# Patient Record
Sex: MALE | Race: WHITE | NOT HISPANIC OR LATINO
[De-identification: names, ages, dates, MRNs, and addresses within clinical notes are randomized per-mention and may not be internally consistent; named-entity substitution may affect disease eponyms.]

---

## 2017-02-03 ENCOUNTER — APPOINTMENT (OUTPATIENT)
Dept: INTERNAL MEDICINE | Facility: CLINIC | Age: 42
End: 2017-02-03

## 2017-02-03 VITALS
SYSTOLIC BLOOD PRESSURE: 120 MMHG | DIASTOLIC BLOOD PRESSURE: 76 MMHG | HEIGHT: 73 IN | HEART RATE: 100 BPM | OXYGEN SATURATION: 99 % | BODY MASS INDEX: 33.8 KG/M2 | TEMPERATURE: 97.6 F | WEIGHT: 255 LBS

## 2017-02-03 DIAGNOSIS — M54.9 DORSALGIA, UNSPECIFIED: ICD-10-CM

## 2017-02-06 LAB
ALBUMIN SERPL ELPH-MCNC: 4.8 G/DL
ALP BLD-CCNC: 66 U/L
ALT SERPL-CCNC: 19 U/L
ANION GAP SERPL CALC-SCNC: 20 MMOL/L
APPEARANCE: CLEAR
AST SERPL-CCNC: 21 U/L
BASOPHILS # BLD AUTO: 0.02 K/UL
BASOPHILS NFR BLD AUTO: 0.3 %
BILIRUB SERPL-MCNC: 1 MG/DL
BILIRUBIN URINE: NEGATIVE
BLOOD URINE: NEGATIVE
BUN SERPL-MCNC: 25 MG/DL
C TRACH DNA SPEC QL NAA+PROBE: NORMAL
C TRACH RRNA SPEC QL NAA+PROBE: NORMAL
CALCIUM SERPL-MCNC: 10.1 MG/DL
CHLORIDE SERPL-SCNC: 100 MMOL/L
CHOLEST SERPL-MCNC: 172 MG/DL
CHOLEST/HDLC SERPL: 4.8 RATIO
CO2 SERPL-SCNC: 20 MMOL/L
COLOR: YELLOW
CREAT SERPL-MCNC: 1.27 MG/DL
EOSINOPHIL # BLD AUTO: 0.12 K/UL
EOSINOPHIL NFR BLD AUTO: 1.7 %
GLUCOSE QUALITATIVE U: NORMAL MG/DL
GLUCOSE SERPL-MCNC: 98 MG/DL
HBA1C MFR BLD HPLC: 5.3 %
HCT VFR BLD CALC: 49.9 %
HDLC SERPL-MCNC: 36 MG/DL
HGB BLD-MCNC: 16.6 G/DL
HIV1+2 AB SPEC QL IA.RAPID: NONREACTIVE
HSV 1+2 IGG SER IA-IMP: POSITIVE
HSV 1+2 IGG SER IA-IMP: POSITIVE
HSV1 IGG SER QL: 61.8 INDEX
HSV2 IGG SER QL: 2.22 INDEX
IMM GRANULOCYTES NFR BLD AUTO: 0.1 %
KETONES URINE: NEGATIVE
LDLC SERPL CALC-MCNC: 107 MG/DL
LEUKOCYTE ESTERASE URINE: NEGATIVE
LYMPHOCYTES # BLD AUTO: 2.09 K/UL
LYMPHOCYTES NFR BLD AUTO: 29.9 %
MAN DIFF?: NORMAL
MCHC RBC-ENTMCNC: 30.6 PG
MCHC RBC-ENTMCNC: 33.3 GM/DL
MCV RBC AUTO: 92.1 FL
MONOCYTES # BLD AUTO: 0.45 K/UL
MONOCYTES NFR BLD AUTO: 6.4 %
N GONORRHOEA DNA SPEC QL NAA+PROBE: NORMAL
N GONORRHOEA RRNA SPEC QL NAA+PROBE: NORMAL
NEUTROPHILS # BLD AUTO: 4.3 K/UL
NEUTROPHILS NFR BLD AUTO: 61.6 %
NITRITE URINE: NEGATIVE
PH URINE: 6
PLATELET # BLD AUTO: 178 K/UL
POTASSIUM SERPL-SCNC: 4.7 MMOL/L
PROT SERPL-MCNC: 7 G/DL
PROTEIN URINE: NEGATIVE MG/DL
PSA SERPL-MCNC: 0.67 NG/ML
RBC # BLD: 5.42 M/UL
RBC # FLD: 14.7 %
SODIUM SERPL-SCNC: 140 MMOL/L
SOURCE AMPLIFICATION: NORMAL
SPECIFIC GRAVITY URINE: 1.02
T PALLIDUM AB SER QL IA: NEGATIVE
TRIGL SERPL-MCNC: 147 MG/DL
UROBILINOGEN URINE: NORMAL MG/DL
WBC # FLD AUTO: 6.99 K/UL

## 2017-05-25 ENCOUNTER — APPOINTMENT (OUTPATIENT)
Dept: INTERNAL MEDICINE | Facility: CLINIC | Age: 42
End: 2017-05-25

## 2017-05-25 VITALS
DIASTOLIC BLOOD PRESSURE: 78 MMHG | OXYGEN SATURATION: 98 % | TEMPERATURE: 98.6 F | HEIGHT: 72 IN | BODY MASS INDEX: 34.54 KG/M2 | SYSTOLIC BLOOD PRESSURE: 130 MMHG | HEART RATE: 76 BPM | WEIGHT: 255 LBS

## 2017-05-26 LAB
HAV IGG+IGM SER QL: REACTIVE
HBV SURFACE AB SER QL: REACTIVE
HBV SURFACE AG SER QL: NONREACTIVE
HCV AB SER QL: NONREACTIVE
HCV S/CO RATIO: 0.1 S/CO

## 2018-05-24 ENCOUNTER — APPOINTMENT (OUTPATIENT)
Dept: INTERNAL MEDICINE | Facility: CLINIC | Age: 43
End: 2018-05-24
Payer: COMMERCIAL

## 2018-05-24 VITALS — TEMPERATURE: 97.8 F

## 2018-05-24 PROCEDURE — 99213 OFFICE O/P EST LOW 20 MIN: CPT

## 2018-05-24 RX ORDER — DEXTROAMPHETAMINE SACCHARATE, AMPHETAMINE ASPARTATE, DEXTROAMPHETAMINE SULFATE AND AMPHETAMINE SULFATE 5; 5; 5; 5 MG/1; MG/1; MG/1; MG/1
20 TABLET ORAL
Qty: 60 | Refills: 0 | Status: ACTIVE | COMMUNITY
Start: 2017-12-07

## 2018-05-24 NOTE — ASSESSMENT
[FreeTextEntry1] : CDC recommendations for each of these countries was reviewed with patient. Note that he is already immune to hepatitis A and hepatitis B and received typhoid vaccine 18 months ago.\par \par DTaP and malaria prophylaxis advised for all countries. In addition polio booster and yellow fever advised for [place].\par \par Patient states he will decide by next week and will make arrangements for appropriate vaccination at that time.

## 2018-06-06 ENCOUNTER — APPOINTMENT (OUTPATIENT)
Dept: INTERNAL MEDICINE | Facility: CLINIC | Age: 43
End: 2018-06-06

## 2018-06-06 ENCOUNTER — APPOINTMENT (OUTPATIENT)
Dept: INTERNAL MEDICINE | Facility: CLINIC | Age: 43
End: 2018-06-06
Payer: COMMERCIAL

## 2018-06-06 DIAGNOSIS — Z23 ENCOUNTER FOR IMMUNIZATION: ICD-10-CM

## 2018-06-06 DIAGNOSIS — Z71.89 OTHER SPECIFIED COUNSELING: ICD-10-CM

## 2018-06-06 PROCEDURE — 90471 IMMUNIZATION ADMIN: CPT

## 2018-06-06 PROCEDURE — 99214 OFFICE O/P EST MOD 30 MIN: CPT | Mod: 25

## 2018-06-06 PROCEDURE — 90715 TDAP VACCINE 7 YRS/> IM: CPT

## 2018-06-06 RX ORDER — PREDNISONE 10 MG/1
10 TABLET ORAL
Qty: 21 | Refills: 0 | Status: DISCONTINUED | COMMUNITY
Start: 2018-01-02 | End: 2018-06-06

## 2018-06-06 RX ORDER — ARIPIPRAZOLE 15 MG/1
15 TABLET ORAL
Qty: 30 | Refills: 0 | Status: DISCONTINUED | COMMUNITY
Start: 2017-12-07 | End: 2018-06-06

## 2018-06-06 RX ORDER — VILAZODONE HYDROCHLORIDE 40 MG/1
40 TABLET ORAL
Qty: 30 | Refills: 0 | Status: DISCONTINUED | COMMUNITY
Start: 2017-10-19 | End: 2018-06-06

## 2018-06-06 RX ORDER — AMOXICILLIN AND CLAVULANATE POTASSIUM 875; 125 MG/1; MG/1
875-125 TABLET, COATED ORAL
Qty: 20 | Refills: 0 | Status: DISCONTINUED | COMMUNITY
Start: 2017-12-25 | End: 2018-06-06

## 2018-06-06 RX ORDER — CIPROFLOXACIN HYDROCHLORIDE 500 MG/1
500 TABLET, FILM COATED ORAL TWICE DAILY
Qty: 28 | Refills: 1 | Status: DISCONTINUED | COMMUNITY
Start: 2017-05-25 | End: 2018-06-06

## 2018-06-06 RX ORDER — ARIPIPRAZOLE 5 MG/1
5 TABLET ORAL
Qty: 30 | Refills: 0 | Status: DISCONTINUED | COMMUNITY
Start: 2017-11-25 | End: 2018-06-06

## 2018-06-06 RX ORDER — SERTRALINE HYDROCHLORIDE 50 MG/1
50 TABLET, FILM COATED ORAL
Qty: 30 | Refills: 0 | Status: DISCONTINUED | COMMUNITY
Start: 2017-05-23 | End: 2018-06-06

## 2018-06-06 RX ORDER — LORAZEPAM 0.5 MG/1
0.5 TABLET ORAL
Qty: 30 | Refills: 0 | Status: DISCONTINUED | COMMUNITY
Start: 2017-12-21 | End: 2018-06-06

## 2018-07-10 ENCOUNTER — LABORATORY RESULT (OUTPATIENT)
Age: 43
End: 2018-07-10

## 2018-07-10 ENCOUNTER — APPOINTMENT (OUTPATIENT)
Dept: INTERNAL MEDICINE | Facility: CLINIC | Age: 43
End: 2018-07-10
Payer: COMMERCIAL

## 2018-07-10 VITALS
OXYGEN SATURATION: 96 % | DIASTOLIC BLOOD PRESSURE: 88 MMHG | HEIGHT: 72 IN | BODY MASS INDEX: 34.81 KG/M2 | HEART RATE: 71 BPM | TEMPERATURE: 98.1 F | SYSTOLIC BLOOD PRESSURE: 112 MMHG | WEIGHT: 257 LBS

## 2018-07-10 PROCEDURE — G0444 DEPRESSION SCREEN ANNUAL: CPT

## 2018-07-10 PROCEDURE — G0447 BEHAVIOR COUNSEL OBESITY 15M: CPT

## 2018-07-10 PROCEDURE — 99396 PREV VISIT EST AGE 40-64: CPT | Mod: 25

## 2018-07-10 PROCEDURE — 99213 OFFICE O/P EST LOW 20 MIN: CPT | Mod: 25

## 2018-07-10 PROCEDURE — 36415 COLL VENOUS BLD VENIPUNCTURE: CPT

## 2018-07-10 NOTE — HEALTH RISK ASSESSMENT
[Good] : ~his/her~  mood as  good [No falls in past year] : Patient reported no falls in the past year [Patient reported colonoscopy was abnormal] : Patient reported colonoscopy was abnormal [Hepatitis C test offered] : Hepatitis C test offered [Alone] : lives alone [Unemployed] : unemployed [Single] : single [Sexually Active] : sexually active [Fully functional (bathing, dressing, toileting, transferring, walking, feeding)] : Fully functional (bathing, dressing, toileting, transferring, walking, feeding) [Fully functional (using the telephone, shopping, preparing meals, housekeeping, doing laundry, using] : Fully functional and needs no help or supervision to perform IADLs (using the telephone, shopping, preparing meals, housekeeping, doing laundry, using transportation, managing medications and managing finances) [Smoke Detector] : smoke detector [Seat Belt] :  uses seat belt [Sunscreen] : uses sunscreen [Patient declined discussion] : Patient declined discussion [] : No [Change in mental status noted] : No change in mental status noted [High Risk Behavior] : no high risk behavior [Reports changes in hearing] : Reports no changes in hearing [Reports changes in vision] : Reports no changes in vision [TB Exposure] : is not being exposed to tuberculosis [ColonoscopyDate] : 6/2014 [ColonoscopyComments] : polyps

## 2018-07-10 NOTE — HISTORY OF PRESENT ILLNESS
[FreeTextEntry1] : He is now 42 years old.\par In the past year, no hospitalizations, no surgery, no new medical diagnoses. [de-identified] : Patient is concerned about progressive enlargement of epigastric hernia over the past 5 or 6 months. States it has been there as a small bulge for years but has become a large vertical bulge in the past 6 months. May be associated with a small umbilicus herniation as well. Both of these are completely painless and has not affected any type of gastrointestinal function. The umbilical area is a little tender to palpation.

## 2018-07-10 NOTE — COUNSELING
[Weight management counseling provided] : Weight management [Healthy eating counseling provided] : healthy eating [Activity counseling provided] : activity [Good understanding] : Patient has a good understanding of lifestyle changes and the steps needed to achieve self management goals [ - Annual Depression Screening] : Annual Depression Screening

## 2018-07-10 NOTE — ASSESSMENT
[FreeTextEntry1] : Health maintenance.\par His BMI is significantly elevated and at least a 35 pound weight loss is recommended. Obesity-associated morbidities reviewed and dietary and lifestyle approaches to achieve a weight loss were discussed in detail. Patient declines referral to obesity management at this time.\par Daily aerobic exercises strongly recommended.\par Minimal STD risk and no substance abuse per patient report. Safe sex behavior was discussed and STD testing is sent.\par HIV and hepatitis C antibodies sent with patient approval.\par Occasional gender specific self-examination is suggested.\par Minimal depression without suicidality. Competent with ADLs. At patient request, Wellbutrin has been represcribed.\par Yearly flu vaccine is recommended.\par \par Epigastric hernia.\par We had an extended conversation for at least 10 minutes regarding the prognosis and treatment options for this condition. Surgical correction is usually not necessary as strangulation or other complication is extremely unlikely. However progressive worsening is certainly possible especially given this patient's lifestyle which includes weightlifting and other fairly intensive sports activities.\par Based on the above, I have recommended that he consult with a hernia surgeon regarding indications for surgery and appropriate further workup including possible MRI studies. Specific contact information provided. Patient states she will make an appointment within the next few weeks.\par \par Depression.\par Patient requests refill of bupropion as he has not had the chance to see a psychiatrist in the past year or so. This was provided for 6 months. Psychotherapic counseling Was undertaken for about 10 minutes But patient also advised to establish a relationship with a new psychotherapist or psychiatrist. Patient understands that I will be willing to prescribe standard antidepressant medication as long as he is also in psychotherapy.\par \par

## 2018-07-10 NOTE — PHYSICAL EXAM

## 2018-07-11 LAB
ALBUMIN SERPL ELPH-MCNC: 4.5 G/DL
ALP BLD-CCNC: 69 U/L
ALT SERPL-CCNC: 15 U/L
ANION GAP SERPL CALC-SCNC: 19 MMOL/L
APPEARANCE: ABNORMAL
AST SERPL-CCNC: 24 U/L
BASOPHILS # BLD AUTO: 0.01 K/UL
BASOPHILS NFR BLD AUTO: 0.2 %
BILIRUB SERPL-MCNC: 0.6 MG/DL
BILIRUBIN URINE: NEGATIVE
BLOOD URINE: NEGATIVE
BUN SERPL-MCNC: 32 MG/DL
CALCIUM SERPL-MCNC: 9.3 MG/DL
CHLORIDE SERPL-SCNC: 101 MMOL/L
CHOLEST SERPL-MCNC: 150 MG/DL
CHOLEST/HDLC SERPL: 4.8 RATIO
CO2 SERPL-SCNC: 19 MMOL/L
COLOR: YELLOW
CREAT SERPL-MCNC: 1.21 MG/DL
EOSINOPHIL # BLD AUTO: 0.18 K/UL
EOSINOPHIL NFR BLD AUTO: 3.1 %
GLUCOSE QUALITATIVE U: NEGATIVE MG/DL
GLUCOSE SERPL-MCNC: 70 MG/DL
HBA1C MFR BLD HPLC: 5 %
HCT VFR BLD CALC: 46.7 %
HCV AB SER QL: NONREACTIVE
HCV S/CO RATIO: 0.08 S/CO
HDLC SERPL-MCNC: 31 MG/DL
HGB BLD-MCNC: 15 G/DL
IMM GRANULOCYTES NFR BLD AUTO: 0.3 %
KETONES URINE: NEGATIVE
LDLC SERPL CALC-MCNC: 78 MG/DL
LEUKOCYTE ESTERASE URINE: NEGATIVE
LYMPHOCYTES # BLD AUTO: 2.43 K/UL
LYMPHOCYTES NFR BLD AUTO: 41.7 %
MAN DIFF?: NORMAL
MCHC RBC-ENTMCNC: 30.1 PG
MCHC RBC-ENTMCNC: 32.1 GM/DL
MCV RBC AUTO: 93.8 FL
MONOCYTES # BLD AUTO: 0.51 K/UL
MONOCYTES NFR BLD AUTO: 8.7 %
NEUTROPHILS # BLD AUTO: 2.68 K/UL
NEUTROPHILS NFR BLD AUTO: 46 %
NITRITE URINE: NEGATIVE
PH URINE: 5
PLATELET # BLD AUTO: 179 K/UL
POTASSIUM SERPL-SCNC: 4.4 MMOL/L
PROT SERPL-MCNC: 6.9 G/DL
PROTEIN URINE: NEGATIVE MG/DL
PSA SERPL-MCNC: 0.28 NG/ML
RBC # BLD: 4.98 M/UL
RBC # FLD: 14.3 %
SODIUM SERPL-SCNC: 139 MMOL/L
SPECIFIC GRAVITY URINE: 1.03
TRIGL SERPL-MCNC: 204 MG/DL
UROBILINOGEN URINE: NEGATIVE MG/DL
WBC # FLD AUTO: 5.83 K/UL

## 2018-07-16 LAB
TESTOST BND SERPL-MCNC: 2.4 PG/ML
TESTOST SERPL-MCNC: 217.3 NG/DL

## 2018-08-13 ENCOUNTER — APPOINTMENT (OUTPATIENT)
Dept: SURGERY | Facility: CLINIC | Age: 43
End: 2018-08-13
Payer: COMMERCIAL

## 2018-08-13 VITALS
DIASTOLIC BLOOD PRESSURE: 82 MMHG | HEART RATE: 97 BPM | HEIGHT: 73.23 IN | TEMPERATURE: 98.3 F | WEIGHT: 259 LBS | OXYGEN SATURATION: 96 % | BODY MASS INDEX: 33.96 KG/M2 | SYSTOLIC BLOOD PRESSURE: 135 MMHG

## 2018-08-13 PROCEDURE — 99204 OFFICE O/P NEW MOD 45 MIN: CPT

## 2018-10-01 ENCOUNTER — RX RENEWAL (OUTPATIENT)
Age: 43
End: 2018-10-01

## 2018-10-01 RX ORDER — BUPROPION HYDROCHLORIDE 300 MG/1
300 TABLET, EXTENDED RELEASE ORAL
Qty: 90 | Refills: 3 | Status: ACTIVE | COMMUNITY
Start: 2017-12-07 | End: 1900-01-01

## 2018-10-03 ENCOUNTER — APPOINTMENT (OUTPATIENT)
Dept: INTERNAL MEDICINE | Facility: CLINIC | Age: 43
End: 2018-10-03
Payer: COMMERCIAL

## 2018-10-03 VITALS
TEMPERATURE: 98.3 F | OXYGEN SATURATION: 98 % | HEIGHT: 72 IN | HEART RATE: 84 BPM | BODY MASS INDEX: 35.21 KG/M2 | SYSTOLIC BLOOD PRESSURE: 130 MMHG | WEIGHT: 260 LBS | DIASTOLIC BLOOD PRESSURE: 88 MMHG

## 2018-10-03 DIAGNOSIS — Z86.010 PERSONAL HISTORY OF COLONIC POLYPS: ICD-10-CM

## 2018-10-03 PROCEDURE — 36415 COLL VENOUS BLD VENIPUNCTURE: CPT

## 2018-10-03 PROCEDURE — 99214 OFFICE O/P EST MOD 30 MIN: CPT | Mod: 25

## 2018-10-04 LAB
CHOLEST SERPL-MCNC: 156 MG/DL
CHOLEST/HDLC SERPL: 3.2 RATIO
HDLC SERPL-MCNC: 49 MG/DL
LDLC SERPL CALC-MCNC: 85 MG/DL
SHBG SERPL-SCNC: 28 NMOL/L
TRIGL SERPL-MCNC: 109 MG/DL

## 2018-10-08 LAB
TESTOST BND SERPL-MCNC: 7.5 PG/ML
TESTOST SERPL-MCNC: 345.2 NG/DL

## 2019-03-18 ENCOUNTER — APPOINTMENT (OUTPATIENT)
Dept: INTERNAL MEDICINE | Facility: CLINIC | Age: 44
End: 2019-03-18
Payer: COMMERCIAL

## 2019-03-18 VITALS
SYSTOLIC BLOOD PRESSURE: 136 MMHG | HEIGHT: 73 IN | WEIGHT: 264 LBS | HEART RATE: 98 BPM | TEMPERATURE: 98.6 F | OXYGEN SATURATION: 95 % | BODY MASS INDEX: 34.99 KG/M2 | DIASTOLIC BLOOD PRESSURE: 88 MMHG

## 2019-03-18 PROCEDURE — 36415 COLL VENOUS BLD VENIPUNCTURE: CPT

## 2019-03-18 PROCEDURE — 99213 OFFICE O/P EST LOW 20 MIN: CPT | Mod: 25

## 2019-03-21 LAB
TESTOST BND SERPL-MCNC: 5.6 PG/ML
TESTOST SERPL-MCNC: 369.3 NG/DL

## 2019-03-22 ENCOUNTER — APPOINTMENT (OUTPATIENT)
Dept: UROLOGY | Facility: CLINIC | Age: 44
End: 2019-03-22
Payer: COMMERCIAL

## 2019-03-22 DIAGNOSIS — Z31.41 ENCOUNTER FOR FERTILITY TESTING: ICD-10-CM

## 2019-03-22 DIAGNOSIS — R68.82 DECREASED LIBIDO: ICD-10-CM

## 2019-03-22 PROCEDURE — 99203 OFFICE O/P NEW LOW 30 MIN: CPT

## 2019-03-22 RX ORDER — SILDENAFIL 100 MG/1
100 TABLET, FILM COATED ORAL
Qty: 15 | Refills: 0 | Status: ACTIVE | COMMUNITY
Start: 2019-03-22 | End: 1900-01-01

## 2019-03-26 ENCOUNTER — LABORATORY RESULT (OUTPATIENT)
Age: 44
End: 2019-03-26

## 2019-04-29 ENCOUNTER — APPOINTMENT (OUTPATIENT)
Dept: UROLOGY | Facility: CLINIC | Age: 44
End: 2019-04-29
Payer: COMMERCIAL

## 2019-04-29 PROCEDURE — 99213 OFFICE O/P EST LOW 20 MIN: CPT

## 2019-04-29 NOTE — ASSESSMENT
[FreeTextEntry1] : Reviewed testosterone and gonadotropins\par semen analysis \par volume 1.0 cc\par [95] \par 53% normal  motility\par 1 % normal morphology\par discussed significance \par not attempting to conceive\par semen analysis poor predictive value\par \par discussed low semen volume and low libido\par discussed Clomid 25 mg days and rationale for approach\par repeat labs \par side effects of increased acne breast tenderness hair loss visual disturbance\par patient is anxious to proceed\par fu labs in 6 weeks and followup \par

## 2019-04-29 NOTE — HISTORY OF PRESENT ILLNESS
[FreeTextEntry1] : 43 year old \par single \par straight\par no partner currently \par complaining of erectile dysfunction\par without vigra not able to function\par difficulty obtaining and maintain erection\par long history of testerone abuse x 10 years 1000 mg weekly\par stopped in 2014\par then started on T by endocrinologist   2 year ago and then  maintained by psychiatrist ( DR Tan)\par last T replacement 9/2018\par \par

## 2019-05-06 ENCOUNTER — APPOINTMENT (OUTPATIENT)
Dept: INTERNAL MEDICINE | Facility: CLINIC | Age: 44
End: 2019-05-06
Payer: COMMERCIAL

## 2019-05-06 VITALS — TEMPERATURE: 98.3 F

## 2019-05-06 DIAGNOSIS — Z02.0 ENCOUNTER FOR EXAMINATION FOR ADMISSION TO EDUCATIONAL INSTITUTION: ICD-10-CM

## 2019-05-06 PROCEDURE — 36415 COLL VENOUS BLD VENIPUNCTURE: CPT

## 2019-05-08 LAB
MEV IGG FLD QL IA: >300 AU/ML
MEV IGG+IGM SER-IMP: POSITIVE
MUV AB SER-ACNC: POSITIVE
MUV IGG SER QL IA: 230 AU/ML
RUBV IGG FLD-ACNC: 22.6 INDEX
RUBV IGG SER-IMP: POSITIVE
VZV AB TITR SER: POSITIVE
VZV IGG SER IF-ACNC: 1147 INDEX

## 2019-05-22 ENCOUNTER — MEDICATION RENEWAL (OUTPATIENT)
Age: 44
End: 2019-05-22

## 2019-06-06 LAB
BASOPHILS # BLD AUTO: 0.02 K/UL
BASOPHILS NFR BLD AUTO: 0.3 %
EOSINOPHIL # BLD AUTO: 0.08 K/UL
EOSINOPHIL NFR BLD AUTO: 1.1 %
ESTRADIOL SERPL-MCNC: 35 PG/ML
FSH SERPL-MCNC: 8.1 IU/L
HCT VFR BLD CALC: 45.7 %
HGB BLD-MCNC: 15.6 G/DL
IMM GRANULOCYTES NFR BLD AUTO: 0.1 %
LH SERPL-ACNC: 15.5 IU/L
LYMPHOCYTES # BLD AUTO: 2.47 K/UL
LYMPHOCYTES NFR BLD AUTO: 34.7 %
MAN DIFF?: NORMAL
MCHC RBC-ENTMCNC: 30.6 PG
MCHC RBC-ENTMCNC: 34.1 GM/DL
MCV RBC AUTO: 89.6 FL
MONOCYTES # BLD AUTO: 0.39 K/UL
MONOCYTES NFR BLD AUTO: 5.5 %
NEUTROPHILS # BLD AUTO: 4.15 K/UL
NEUTROPHILS NFR BLD AUTO: 58.3 %
PLATELET # BLD AUTO: 181 K/UL
RBC # BLD: 5.1 M/UL
RBC # FLD: 13.2 %
TESTOST SERPL-MCNC: 440 NG/DL
WBC # FLD AUTO: 7.12 K/UL

## 2019-06-17 ENCOUNTER — APPOINTMENT (OUTPATIENT)
Dept: UROLOGY | Facility: CLINIC | Age: 44
End: 2019-06-17
Payer: COMMERCIAL

## 2019-06-17 DIAGNOSIS — N52.2 DRUG-INDUCED ERECTILE DYSFUNCTION: ICD-10-CM

## 2019-06-17 DIAGNOSIS — E29.1 TESTICULAR HYPOFUNCTION: ICD-10-CM

## 2019-06-17 PROCEDURE — 99213 OFFICE O/P EST LOW 20 MIN: CPT

## 2019-06-17 NOTE — HISTORY OF PRESENT ILLNESS
[FreeTextEntry1] : 43 year old \par single \par straight\par no partner currently \par complaining of erectile dysfunction\par without vigra not able to function\par difficulty obtaining and maintain erection\par long history of testerone abuse x 10 years 1000 mg weekly\par stopped in 2014\par then started on T by endocrinologist   2 year ago and then  maintained by psychiatrist ( DR Tan)\par last T replacement 9/2018\par \par \par 6.17.2019\par started on clomid\par continues to complain of loss of libido \par "no desire" to have sexual intercourse\par no side effects\par HAY 14; no steady partner

## 2019-06-17 NOTE — ASSESSMENT
[FreeTextEntry1] : Patient has had appropriate hormonal response to clomid\par Has not achieved improved libido\par Will maintain current regimen and reassess in 3 months\par \par Depression well controlled.\par His psychiatrist has retired\par Referred to Dr Toro for management

## 2019-06-17 NOTE — PHYSICAL EXAM
[Urethral Meatus] : meatus normal [Penis Abnormality] : normal circumcised penis [Epididymis] : the epididymides were normal [Testes Tenderness] : no tenderness of the testes [Skin Color & Pigmentation] : normal skin color and pigmentation [FreeTextEntry1] : no gynecomastia

## 2019-08-06 ENCOUNTER — LABORATORY RESULT (OUTPATIENT)
Age: 44
End: 2019-08-06

## 2019-08-09 ENCOUNTER — APPOINTMENT (OUTPATIENT)
Dept: UROLOGY | Facility: CLINIC | Age: 44
End: 2019-08-09
Payer: COMMERCIAL

## 2019-08-09 LAB — ESTRADIOL SERPL-MCNC: 43 PG/ML

## 2019-08-09 PROCEDURE — 99213 OFFICE O/P EST LOW 20 MIN: CPT

## 2019-08-09 RX ORDER — ARIPIPRAZOLE 10 MG/1
10 TABLET ORAL
Qty: 30 | Refills: 0 | Status: DISCONTINUED | COMMUNITY
Start: 2017-04-28 | End: 2019-08-09

## 2019-08-09 RX ORDER — FLUTICASONE PROPIONATE 50 UG/1
50 SPRAY, METERED NASAL
Qty: 16 | Refills: 0 | Status: DISCONTINUED | COMMUNITY
Start: 2017-12-25 | End: 2019-08-09

## 2019-08-09 RX ORDER — NEOMYCIN SULFATE, POLYMYXIN B SULFATE, HYDROCORTISONE 3.5; 10000; 1 MG/ML; [USP'U]/ML; MG/ML
1 SOLUTION/ DROPS AURICULAR (OTIC)
Qty: 10 | Refills: 0 | Status: DISCONTINUED | COMMUNITY
Start: 2018-01-02 | End: 2019-08-09

## 2019-08-09 RX ORDER — ATOVAQUONE AND PROGUANIL HYDROCHLORIDE 250; 100 MG/1; MG/1
250-100 TABLET, FILM COATED ORAL DAILY
Qty: 30 | Refills: 0 | Status: DISCONTINUED | COMMUNITY
Start: 2017-05-25 | End: 2019-08-09

## 2019-08-09 NOTE — ASSESSMENT
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

## 2019-08-09 NOTE — HISTORY OF PRESENT ILLNESS
[FreeTextEntry1] : 43 year old \par single \par straight\par no partner currently \par complaining of erectile dysfunction\par without vigra not able to function\par difficulty obtaining and maintain erection\par long history of testerone abuse x 10 years 1000 mg weekly\par stopped in 2014\par then started on T by endocrinologist 2 year ago and then maintained by psychiatrist ( DR Tan)\par last T replacement 9/2018\par \par \par 6.17.2019\par started on clomid\par continues to complain of loss of libido \par "no desire" to have sexual intercourse\par no side effects\par HAY 14; no steady partner \par \par 8.9.2019\par returned from trip [place]\par continues to complain of sexual dysfunction\par started on Sertraline for depression which has responded\par no longer depressed

## 2019-08-12 ENCOUNTER — RX RENEWAL (OUTPATIENT)
Age: 44
End: 2019-08-12

## 2019-08-13 LAB
TESTOST BND SERPL-MCNC: 10.2 PG/ML
TESTOST SERPL-MCNC: 534.1 NG/DL

## 2019-08-14 LAB — SHBG-ESOTERIX: 42.2 NMOL/L

## 2021-01-27 ENCOUNTER — APPOINTMENT (OUTPATIENT)
Dept: INTERNAL MEDICINE | Facility: CLINIC | Age: 46
End: 2021-01-27
Payer: MEDICAID

## 2021-01-27 VITALS
TEMPERATURE: 97.6 F | HEIGHT: 73 IN | DIASTOLIC BLOOD PRESSURE: 82 MMHG | WEIGHT: 263 LBS | SYSTOLIC BLOOD PRESSURE: 135 MMHG | BODY MASS INDEX: 34.85 KG/M2 | HEART RATE: 83 BPM | OXYGEN SATURATION: 95 %

## 2021-01-27 DIAGNOSIS — Z12.11 ENCOUNTER FOR SCREENING FOR MALIGNANT NEOPLASM OF COLON: ICD-10-CM

## 2021-01-27 DIAGNOSIS — R39.11 HESITANCY OF MICTURITION: ICD-10-CM

## 2021-01-27 DIAGNOSIS — R00.2 PALPITATIONS: ICD-10-CM

## 2021-01-27 DIAGNOSIS — F32.9 MAJOR DEPRESSIVE DISORDER, SINGLE EPISODE, UNSPECIFIED: ICD-10-CM

## 2021-01-27 PROCEDURE — 99396 PREV VISIT EST AGE 40-64: CPT | Mod: 25

## 2021-01-27 PROCEDURE — 93000 ELECTROCARDIOGRAM COMPLETE: CPT

## 2021-01-27 PROCEDURE — G0447 BEHAVIOR COUNSEL OBESITY 15M: CPT

## 2021-01-27 PROCEDURE — 99212 OFFICE O/P EST SF 10 MIN: CPT | Mod: 25

## 2021-01-27 PROCEDURE — 99072 ADDL SUPL MATRL&STAF TM PHE: CPT

## 2021-01-27 RX ORDER — CLOMIPHENE CITRATE 50 MG/1
50 TABLET ORAL DAILY
Qty: 15 | Refills: 2 | Status: DISCONTINUED | COMMUNITY
Start: 2019-06-17 | End: 2021-01-27

## 2021-01-27 RX ORDER — RANITIDINE HYDROCHLORIDE 300 MG/1
300 CAPSULE ORAL
Qty: 60 | Refills: 3 | Status: DISCONTINUED | COMMUNITY
Start: 2017-05-25 | End: 2021-01-27

## 2021-01-27 RX ORDER — ARIPIPRAZOLE 15 MG/1
15 TABLET ORAL DAILY
Refills: 0 | Status: ACTIVE | COMMUNITY
Start: 2021-01-27

## 2021-01-27 RX ORDER — CLOMIPHENE CITRATE 50 MG/1
50 TABLET ORAL
Qty: 50 | Refills: 2 | Status: DISCONTINUED | COMMUNITY
Start: 2019-04-29 | End: 2021-01-27

## 2021-01-28 LAB
ALBUMIN SERPL ELPH-MCNC: 5 G/DL
ALP BLD-CCNC: 95 U/L
ALT SERPL-CCNC: 14 U/L
ANION GAP SERPL CALC-SCNC: 12 MMOL/L
APPEARANCE: CLEAR
AST SERPL-CCNC: 20 U/L
BASOPHILS # BLD AUTO: 0.04 K/UL
BASOPHILS NFR BLD AUTO: 0.5 %
BILIRUB SERPL-MCNC: 0.7 MG/DL
BILIRUBIN URINE: NEGATIVE
BLOOD URINE: NEGATIVE
BUN SERPL-MCNC: 23 MG/DL
CALCIUM SERPL-MCNC: 9.8 MG/DL
CHLORIDE SERPL-SCNC: 103 MMOL/L
CHOLEST SERPL-MCNC: 178 MG/DL
CO2 SERPL-SCNC: 25 MMOL/L
COLOR: YELLOW
CREAT SERPL-MCNC: 1.17 MG/DL
EOSINOPHIL # BLD AUTO: 0.12 K/UL
EOSINOPHIL NFR BLD AUTO: 1.6 %
ESTIMATED AVERAGE GLUCOSE: 100 MG/DL
GLUCOSE QUALITATIVE U: NEGATIVE
GLUCOSE SERPL-MCNC: 117 MG/DL
HBA1C MFR BLD HPLC: 5.1 %
HCT VFR BLD CALC: 46.9 %
HDLC SERPL-MCNC: 52 MG/DL
HGB BLD-MCNC: 15.2 G/DL
IMM GRANULOCYTES NFR BLD AUTO: 0.1 %
KETONES URINE: NEGATIVE
LDLC SERPL CALC-MCNC: 92 MG/DL
LEUKOCYTE ESTERASE URINE: NEGATIVE
LYMPHOCYTES # BLD AUTO: 2.48 K/UL
LYMPHOCYTES NFR BLD AUTO: 34 %
MAN DIFF?: NORMAL
MCHC RBC-ENTMCNC: 30.3 PG
MCHC RBC-ENTMCNC: 32.4 GM/DL
MCV RBC AUTO: 93.4 FL
MONOCYTES # BLD AUTO: 0.51 K/UL
MONOCYTES NFR BLD AUTO: 7 %
NEUTROPHILS # BLD AUTO: 4.13 K/UL
NEUTROPHILS NFR BLD AUTO: 56.8 %
NITRITE URINE: NEGATIVE
NONHDLC SERPL-MCNC: 126 MG/DL
PH URINE: 6.5
PLATELET # BLD AUTO: 208 K/UL
POTASSIUM SERPL-SCNC: 4 MMOL/L
PROT SERPL-MCNC: 6.9 G/DL
PROTEIN URINE: NEGATIVE
PSA SERPL-MCNC: 0.2 NG/ML
RBC # BLD: 5.02 M/UL
RBC # FLD: 13.6 %
SODIUM SERPL-SCNC: 140 MMOL/L
SPECIFIC GRAVITY URINE: 1.02
T4 FREE SERPL-MCNC: 1.5 NG/DL
TRIGL SERPL-MCNC: 169 MG/DL
TSH SERPL-ACNC: 1.28 UIU/ML
UROBILINOGEN URINE: NORMAL
WBC # FLD AUTO: 7.29 K/UL

## 2021-02-03 LAB
TESTOST BND SERPL-MCNC: 12.5 NG/DL
TESTOSTERONE BIOAVAILABLE: 132 NG/DL
TESTOSTERONE TOTAL S: 480 NG/DL

## 2021-02-25 ENCOUNTER — APPOINTMENT (OUTPATIENT)
Dept: PULMONOLOGY | Facility: CLINIC | Age: 46
End: 2021-02-25

## 2021-04-19 ENCOUNTER — APPOINTMENT (OUTPATIENT)
Age: 46
End: 2021-04-19

## 2021-05-10 DIAGNOSIS — Z01.812 ENCOUNTER FOR PREPROCEDURAL LABORATORY EXAMINATION: ICD-10-CM

## 2021-05-14 ENCOUNTER — APPOINTMENT (OUTPATIENT)
Age: 46
End: 2021-05-14
Payer: MEDICAID

## 2021-05-14 ENCOUNTER — RESULT REVIEW (OUTPATIENT)
Age: 46
End: 2021-05-14

## 2021-05-14 PROCEDURE — 45380 COLONOSCOPY AND BIOPSY: CPT | Mod: 59,33

## 2021-05-14 PROCEDURE — 45385 COLONOSCOPY W/LESION REMOVAL: CPT | Mod: 33

## 2021-06-02 ENCOUNTER — TRANSCRIPTION ENCOUNTER (OUTPATIENT)
Age: 46
End: 2021-06-02

## 2021-06-02 ENCOUNTER — APPOINTMENT (OUTPATIENT)
Dept: INTERNAL MEDICINE | Facility: CLINIC | Age: 46
End: 2021-06-02
Payer: MEDICAID

## 2021-06-02 VITALS
SYSTOLIC BLOOD PRESSURE: 120 MMHG | OXYGEN SATURATION: 96 % | TEMPERATURE: 97.6 F | HEIGHT: 73 IN | BODY MASS INDEX: 34.99 KG/M2 | HEART RATE: 90 BPM | DIASTOLIC BLOOD PRESSURE: 76 MMHG | WEIGHT: 264 LBS

## 2021-06-02 DIAGNOSIS — G47.09 OTHER INSOMNIA: ICD-10-CM

## 2021-06-02 DIAGNOSIS — R49.0 DYSPHONIA: ICD-10-CM

## 2021-06-02 PROCEDURE — 99214 OFFICE O/P EST MOD 30 MIN: CPT

## 2021-07-01 ENCOUNTER — APPOINTMENT (OUTPATIENT)
Dept: OTOLARYNGOLOGY | Facility: CLINIC | Age: 46
End: 2021-07-01
Payer: MEDICAID

## 2021-07-01 VITALS
TEMPERATURE: 97.8 F | BODY MASS INDEX: 34.99 KG/M2 | HEIGHT: 73 IN | WEIGHT: 264 LBS | DIASTOLIC BLOOD PRESSURE: 76 MMHG | OXYGEN SATURATION: 96 % | SYSTOLIC BLOOD PRESSURE: 118 MMHG | HEART RATE: 88 BPM

## 2021-07-01 PROCEDURE — 31231 NASAL ENDOSCOPY DX: CPT

## 2021-07-01 PROCEDURE — 99204 OFFICE O/P NEW MOD 45 MIN: CPT | Mod: 25

## 2021-07-01 RX ORDER — SALMONELLA TYPHI TY21A 6000000000 [CFU]/1
CAPSULE, COATED ORAL
Qty: 1 | Refills: 0 | Status: DISCONTINUED | COMMUNITY
Start: 2017-05-25 | End: 2021-07-01

## 2021-07-01 NOTE — ASSESSMENT
[FreeTextEntry1] : Discussed the inflammatory sinus drainage as possibly exacerbating existing reflux/LPR sxs. CT sinus, abx & RTC\par \par Reviewed reflux precautions and provided the patient with the corresponding educational handout.\par \par Discussed allergen mitigation and provided the patient with the corresponding educational handout; reviewed proper nasal steroid administration technique.\par \par Discussed the dangers of untreated SHELBI; HST & RTC.

## 2021-07-01 NOTE — HISTORY OF PRESENT ILLNESS
[de-identified] : On & off for sev years he's had mild soreness with swallowing though no jenni dysphagia; some hoarseness. Globus and clearing. Has been on famotidine 20mg po BID for the last year but still has intermittent reflux. No odynophonia or unexp wt loss. Drinks a lot of coffee & has a poor diet. Rare cigar smoker. \par Hx of constant sinus infections that seem to have improved in the last few years; infrequent discolored rhinorrhea. 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 \par Previously moderate SHELBI but stopped using his CPAP; some daytime sleepiness.

## 2021-07-01 NOTE — PROCEDURE
[FreeTextEntry6] : We discussed the elevated risk of liberation of viral particles from the nasopharynx if the patient were to be asymptomatically infected with COVID-19; after weighing the risks & benefits the decision was made to proceed. The procedure was performed while wearing appropriate PPE and a camera attached to a video system was used to maximize separation from the patient. The scope was handled appropriately. \par Indication: requirement for exam not possible via anterior rhinoscopy; chronic nasal obstruction\par After verbal consent and the administration of a small amount of an aerosolized phenylephrine/lidocaine mix, examination was performed with a flexible endoscope. Findings:\par Septum: R NSD\par Mucosa: normal\par Polyposis: not present\par Inferior turbinates: unremarkable\par Middle and superior turbinates: normal\par Inferior meatus: unremarkable\par Middle meatus: unremarkable\par Superior meatus: unremarkable\par Speno-ethmoidal recess: unremarkable\par Nasopharynx: unremarkable\par Secretions: copious purulent R side emerging from the pos mid meatus streaming through NP to R lateral OP\par Other findings: none [de-identified] : Indication: requirement for exam not possible via anterior examination; SHELBI & odynophagia\par After verbal consent and the administration of an aerosolized phenylephrine/lidocaine mix, examination was performed with a flexible endoscope placed through the nose. Findings:\par Nasopharynx: unremarkable\par Soft palate, lateral and posterior pharyngeal walls: unremarkable\par Base of tongue & lingual tonsil: minimal retrodisplacement\par Vallecula: unremarkable\par Epiglottis: unremarkable\par Piriform sinuses and pharyngoesophageal junction: unremarkable\par Arytenoids and AE folds: moderate interarytenoid edema\par Ventricle and false vocal folds: unremarkable\par True vocal folds: Smooth free edge; surface without ectasias or edema; normal movement bilaterally with good apposition in phonation\par Visible subglottis: unremarkable\par Other findings: streaming purulence as above; ELM

## 2021-07-01 NOTE — CONSULT LETTER
[Dear  ___] : Dear  [unfilled], [Consult Letter:] : I had the pleasure of evaluating your patient, [unfilled]. [Please see my note below.] : Please see my note below. [Consult Closing:] : Thank you very much for allowing me to participate in the care of this patient.  If you have any questions, please do not hesitate to contact me. [Sincerely,] : Sincerely, [FreeTextEntry3] : CARRIE Yadav Jr, MD, FAAOHNS\par Otolaryngologist\par [facility]

## 2021-07-01 NOTE — PHYSICAL EXAM
[Nasal Endoscopy Performed] : nasal endoscopy was performed, see procedure section for findings [] : septum deviated to the right [Laryngoscopy Performed] : laryngoscopy was performed, see procedure section for findings [Normal] : no rashes [de-identified] : [facility] 3

## 2021-07-07 ENCOUNTER — RESULT REVIEW (OUTPATIENT)
Age: 46
End: 2021-07-07

## 2021-07-07 ENCOUNTER — APPOINTMENT (OUTPATIENT)
Dept: CT IMAGING | Facility: CLINIC | Age: 46
End: 2021-07-07
Payer: MEDICAID

## 2021-07-07 ENCOUNTER — OUTPATIENT (OUTPATIENT)
Dept: OUTPATIENT SERVICES | Facility: HOSPITAL | Age: 46
LOS: 1 days | End: 2021-07-07

## 2021-07-07 PROCEDURE — 70486 CT MAXILLOFACIAL W/O DYE: CPT | Mod: 26

## 2021-07-08 ENCOUNTER — NON-APPOINTMENT (OUTPATIENT)
Age: 46
End: 2021-07-08

## 2021-07-13 ENCOUNTER — APPOINTMENT (OUTPATIENT)
Dept: SURGERY | Facility: CLINIC | Age: 46
End: 2021-07-13
Payer: MEDICAID

## 2021-07-13 VITALS
SYSTOLIC BLOOD PRESSURE: 126 MMHG | BODY MASS INDEX: 34.99 KG/M2 | HEIGHT: 73 IN | HEART RATE: 98 BPM | TEMPERATURE: 98 F | OXYGEN SATURATION: 95 % | DIASTOLIC BLOOD PRESSURE: 76 MMHG | WEIGHT: 264 LBS

## 2021-07-13 DIAGNOSIS — I48.0 PAROXYSMAL ATRIAL FIBRILLATION: ICD-10-CM

## 2021-07-13 DIAGNOSIS — K42.9 UMBILICAL HERNIA W/OUT OBSTRUCTION OR GANGRENE: ICD-10-CM

## 2021-07-13 DIAGNOSIS — M62.08 SEPARATION OF MUSCLE (NONTRAUMATIC), OTHER SITE: ICD-10-CM

## 2021-07-13 DIAGNOSIS — Z78.9 OTHER SPECIFIED HEALTH STATUS: ICD-10-CM

## 2021-07-13 DIAGNOSIS — Z83.71 FAMILY HISTORY OF COLONIC POLYPS: ICD-10-CM

## 2021-07-13 DIAGNOSIS — Z80.51 FAMILY HISTORY OF MALIGNANT NEOPLASM OF KIDNEY: ICD-10-CM

## 2021-07-13 DIAGNOSIS — N52.9 MALE ERECTILE DYSFUNCTION, UNSPECIFIED: ICD-10-CM

## 2021-07-13 DIAGNOSIS — Z86.39 PERSONAL HISTORY OF OTHER ENDOCRINE, NUTRITIONAL AND METABOLIC DISEASE: ICD-10-CM

## 2021-07-13 DIAGNOSIS — Z83.3 FAMILY HISTORY OF DIABETES MELLITUS: ICD-10-CM

## 2021-07-13 DIAGNOSIS — K21.9 GASTRO-ESOPHAGEAL REFLUX DISEASE W/OUT ESOPHAGITIS: ICD-10-CM

## 2021-07-13 PROCEDURE — 99213 OFFICE O/P EST LOW 20 MIN: CPT

## 2021-07-13 NOTE — HISTORY OF PRESENT ILLNESS
[de-identified] : Mr. Ashton presented previously for evaluation and management of an umbilical hernia.  He stated he has noticed discomfort at the umbilicus for approximately 6 months, which is worse if pressure is applied to the area.  He initially noticed the hernia after gaining weight.  He was a former competitive weight  and .  He generally does not have pain at the umbilicus. [de-identified] : He presented today for follow up.  He denied any significant changes to the hernia.

## 2021-07-13 NOTE — ASSESSMENT
[FreeTextEntry1] : Mr. Ashton is a 45-year-old man with an umbilical hernia and rectus diastasis.  We will plan for an umbilical hernia repair with mesh at the patient’s convenience.  He was referred to plastic surgery to discuss repair of the rectus diastasis at the same time.

## 2021-07-13 NOTE — PHYSICAL EXAM
[Calm] : calm [de-identified] : NAD, comfortable [de-identified] : NCAT, no scleral icterus [de-identified] : +BS soft NT ND.  No hepatosplenomegaly.  Small umbilical hernia, reducible and non-tender.  Moderate rectus diastasis above the umbilicus. [de-identified] : No clubbing, cyanosis, or edema. [de-identified] : Warm, dry. [de-identified] : A&Ox3

## 2021-07-13 NOTE — REVIEW OF SYSTEMS
[Sleep Disturbances] : sleep disturbances [Depression] : depression [Negative] : Heme/Lymph [Suicidal] : not suicidal [Anxiety] : no anxiety [Change In Personality] : no personality change [Emotional Problems] : no emotional problems

## 2021-07-27 ENCOUNTER — APPOINTMENT (OUTPATIENT)
Dept: OTOLARYNGOLOGY | Facility: CLINIC | Age: 46
End: 2021-07-27
Payer: MEDICAID

## 2021-07-27 VITALS
BODY MASS INDEX: 34.99 KG/M2 | DIASTOLIC BLOOD PRESSURE: 76 MMHG | SYSTOLIC BLOOD PRESSURE: 119 MMHG | TEMPERATURE: 98.1 F | OXYGEN SATURATION: 95 % | WEIGHT: 264 LBS | HEIGHT: 73 IN | HEART RATE: 74 BPM

## 2021-07-27 PROCEDURE — 99214 OFFICE O/P EST MOD 30 MIN: CPT | Mod: 25

## 2021-07-27 PROCEDURE — 31231 NASAL ENDOSCOPY DX: CPT

## 2021-07-27 NOTE — PHYSICAL EXAM
[Nasal Endoscopy Performed] : nasal endoscopy was performed, see procedure section for findings [] : septum deviated to the right [Normal] : no rashes [de-identified] : [facility] 3

## 2021-07-27 NOTE — HISTORY OF PRESENT ILLNESS
[de-identified] : On & off for sev years he's had mild soreness with swallowing though no jenni dysphagia- this is improved since last seen. No hoarseness. Improved globus and clearing. Reflux now controlled on famotidine & omeprazole. No odynophonia or unexp wt loss. Drinks a lot of coffee & has a poor diet. Rare cigar smoker. \par Hx of constant sinus infections that seem to have improved in the last few years; infrequent discolored rhinorrhea. Ongoing chronic nasal dyspnea. Now f/u abx for R nasal purulence & s/p CT. 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 \par Previously moderate SHELBI but stopped using his CPAP; some daytime sleepiness. HST pending.

## 2021-07-27 NOTE — PROCEDURE
[FreeTextEntry6] : We discussed the elevated risk of liberation of viral particles from the nasopharynx if the patient were to be asymptomatically infected with COVID-19; after weighing the risks & benefits the decision was made to proceed. The procedure was performed while wearing appropriate PPE and a camera attached to a video system was used to maximize separation from the patient. The scope was handled appropriately. \par Indication: requirement for exam not possible via anterior rhinoscopy; recheck for purlence/polyps\par After verbal consent and the administration of a small amount of an aerosolized phenylephrine/lidocaine mix, examination was performed with a flexible endoscope. Findings:\par Septum: wide R NSD contacting sidewall structures\par Mucosa: normal\par Polyposis: not present\par Inferior turbinates: unremarkable\par Middle and superior turbinates: normal\par Inferior meatus: unremarkable\par Middle meatus: unremarkable\par Superior meatus: unremarkable\par Speno-ethmoidal recess: unremarkable\par Nasopharynx: unremarkable\par Secretions: scattered mild purulence\par Other findings: none

## 2021-07-27 NOTE — ASSESSMENT
[FreeTextEntry1] : Discussed possible mycetoma and likely surgery vs. proceeding w/ MRI as considered by radiology; he would like to proceed w/ the former. Irrigate daily & continue fluticasone. \par \par Will wait for his HST which should be available soon prior to scheduling. \par \par Reinforced behavioral modification as previously discussed.\par

## 2021-07-27 NOTE — DATA REVIEWED
[de-identified] : CT reviewed w/ pt: R max sinus filled w/ hypodense material & widened max ostium; OMC completely opacified; R frontal sinus & ant ethmoids opacified. Wide R DNS impacting the OMC.

## 2021-09-11 ENCOUNTER — APPOINTMENT (OUTPATIENT)
Dept: SLEEP CENTER | Facility: HOME HEALTH | Age: 46
End: 2021-09-11
Payer: MEDICAID

## 2021-09-11 ENCOUNTER — OUTPATIENT (OUTPATIENT)
Dept: OUTPATIENT SERVICES | Facility: HOSPITAL | Age: 46
LOS: 1 days | End: 2021-09-11
Payer: COMMERCIAL

## 2021-09-11 PROCEDURE — 95800 SLP STDY UNATTENDED: CPT | Mod: 26

## 2021-09-11 PROCEDURE — 95800 SLP STDY UNATTENDED: CPT

## 2021-09-14 DIAGNOSIS — G47.33 OBSTRUCTIVE SLEEP APNEA (ADULT) (PEDIATRIC): ICD-10-CM

## 2021-09-24 ENCOUNTER — NON-APPOINTMENT (OUTPATIENT)
Age: 46
End: 2021-09-24

## 2021-09-30 ENCOUNTER — APPOINTMENT (OUTPATIENT)
Dept: OTOLARYNGOLOGY | Facility: CLINIC | Age: 46
End: 2021-09-30
Payer: MEDICAID

## 2021-09-30 VITALS
WEIGHT: 264 LBS | HEART RATE: 99 BPM | HEIGHT: 73 IN | TEMPERATURE: 99.1 F | SYSTOLIC BLOOD PRESSURE: 133 MMHG | OXYGEN SATURATION: 97 % | DIASTOLIC BLOOD PRESSURE: 80 MMHG | BODY MASS INDEX: 34.99 KG/M2

## 2021-09-30 PROCEDURE — 31231 NASAL ENDOSCOPY DX: CPT

## 2021-09-30 PROCEDURE — 99214 OFFICE O/P EST MOD 30 MIN: CPT | Mod: 25

## 2021-09-30 NOTE — ASSESSMENT
Pt tolerated Udencya injection to the abdomen today. NAD. declined AVS. Uses my Ochnser. Discharged home. Ambulated independently.   [FreeTextEntry1] : Surg scheduled, RTC preop. \par

## 2021-09-30 NOTE — PHYSICAL EXAM
[Nasal Endoscopy Performed] : nasal endoscopy was performed, see procedure section for findings [] : septum deviated to the right [de-identified] : [facility] 3 [Normal] : no rashes

## 2021-09-30 NOTE — HISTORY OF PRESENT ILLNESS
[de-identified] : Hx of constant sinus infections that seem to have improved in the last few years; infrequent discolored rhinorrhea. Ongoing chronic nasal dyspnea. Now f/u HST to discuss possible surgery.  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 \par Previously moderate SHELBI but stopped using his CPAP because he lost a lot of weight and sxs improved; some daytime sleepiness. HST w/ no SDB.

## 2021-09-30 NOTE — DATA REVIEWED
[de-identified] : 7/21 CT reviewed w/ pt: R max sinus filled w/ hypodense material & widened max ostium; OMC completely opacified; R frontal sinus & ant ethmoids opacified. Wide R DNS impacting the OMC.  [de-identified] : 9/21 HST: no SDB

## 2021-09-30 NOTE — PROCEDURE
[FreeTextEntry6] : We discussed the elevated risk of liberation of viral particles from the nasopharynx if the patient were to be asymptomatically infected with COVID-19; after weighing the risks & benefits the decision was made to proceed. The procedure was performed while wearing appropriate PPE and a camera attached to a video system was used to maximize separation from the patient. The scope was handled appropriately. \par Indication: requirement for exam not possible via anterior rhinoscopy; recheck for purlence/polyps\par After verbal consent and the administration of a small amount of an aerosolized phenylephrine/lidocaine mix, examination was performed with a flexible endoscope. Findings:\par Septum: wide R NSD contacting sidewall structures\par Mucosa: normal\par Polyposis: not present\par Inferior turbinates: unremarkable\par Middle and superior turbinates: normal\par Inferior meatus: unremarkable\par Middle meatus: unremarkable\par Superior meatus: unremarkable\par Speno-ethmoidal recess: unremarkable\par Nasopharynx: unremarkable\par Secretions: none\par Other findings: none

## 2021-10-19 ENCOUNTER — APPOINTMENT (OUTPATIENT)
Dept: RADIOLOGY | Facility: CLINIC | Age: 46
End: 2021-10-19
Payer: MEDICAID

## 2021-10-19 ENCOUNTER — RESULT REVIEW (OUTPATIENT)
Age: 46
End: 2021-10-19

## 2021-10-19 ENCOUNTER — APPOINTMENT (OUTPATIENT)
Dept: INTERNAL MEDICINE | Facility: CLINIC | Age: 46
End: 2021-10-19
Payer: MEDICAID

## 2021-10-19 ENCOUNTER — OUTPATIENT (OUTPATIENT)
Dept: OUTPATIENT SERVICES | Facility: HOSPITAL | Age: 46
LOS: 1 days | End: 2021-10-19

## 2021-10-19 VITALS
TEMPERATURE: 98.3 F | DIASTOLIC BLOOD PRESSURE: 87 MMHG | WEIGHT: 267 LBS | OXYGEN SATURATION: 98 % | BODY MASS INDEX: 35.39 KG/M2 | HEART RATE: 99 BPM | SYSTOLIC BLOOD PRESSURE: 134 MMHG | HEIGHT: 73 IN

## 2021-10-19 DIAGNOSIS — Z01.818 ENCOUNTER FOR OTHER PREPROCEDURAL EXAMINATION: ICD-10-CM

## 2021-10-19 PROCEDURE — 99214 OFFICE O/P EST MOD 30 MIN: CPT | Mod: 25

## 2021-10-19 PROCEDURE — 71046 X-RAY EXAM CHEST 2 VIEWS: CPT | Mod: 26

## 2021-10-20 LAB
ALBUMIN SERPL ELPH-MCNC: 5 G/DL
ALP BLD-CCNC: 85 U/L
ALT SERPL-CCNC: 17 U/L
ANION GAP SERPL CALC-SCNC: 12 MMOL/L
APTT BLD: 32.9 SEC
AST SERPL-CCNC: 24 U/L
BASOPHILS # BLD AUTO: 0.04 K/UL
BASOPHILS NFR BLD AUTO: 0.6 %
BILIRUB SERPL-MCNC: 0.7 MG/DL
BUN SERPL-MCNC: 16 MG/DL
CALCIUM SERPL-MCNC: 9.5 MG/DL
CHLORIDE SERPL-SCNC: 105 MMOL/L
CO2 SERPL-SCNC: 23 MMOL/L
CREAT SERPL-MCNC: 1.03 MG/DL
EOSINOPHIL # BLD AUTO: 0.09 K/UL
EOSINOPHIL NFR BLD AUTO: 1.3 %
GLUCOSE SERPL-MCNC: 97 MG/DL
HCT VFR BLD CALC: 46.2 %
HGB BLD-MCNC: 15.8 G/DL
IMM GRANULOCYTES NFR BLD AUTO: 0.3 %
INR PPP: 0.98 RATIO
LYMPHOCYTES # BLD AUTO: 2.03 K/UL
LYMPHOCYTES NFR BLD AUTO: 28.3 %
MAN DIFF?: NORMAL
MCHC RBC-ENTMCNC: 30.7 PG
MCHC RBC-ENTMCNC: 34.2 GM/DL
MCV RBC AUTO: 89.7 FL
MONOCYTES # BLD AUTO: 0.5 K/UL
MONOCYTES NFR BLD AUTO: 7 %
NEUTROPHILS # BLD AUTO: 4.5 K/UL
NEUTROPHILS NFR BLD AUTO: 62.5 %
PLATELET # BLD AUTO: 207 K/UL
POTASSIUM SERPL-SCNC: 4 MMOL/L
PROT SERPL-MCNC: 6.9 G/DL
PT BLD: 11.6 SEC
RBC # BLD: 5.15 M/UL
RBC # FLD: 13.2 %
SODIUM SERPL-SCNC: 140 MMOL/L
WBC # FLD AUTO: 7.18 K/UL

## 2021-10-21 ENCOUNTER — APPOINTMENT (OUTPATIENT)
Dept: OTOLARYNGOLOGY | Facility: CLINIC | Age: 46
End: 2021-10-21
Payer: MEDICAID

## 2021-10-21 VITALS
DIASTOLIC BLOOD PRESSURE: 76 MMHG | HEART RATE: 85 BPM | SYSTOLIC BLOOD PRESSURE: 130 MMHG | TEMPERATURE: 97.1 F | WEIGHT: 267 LBS | OXYGEN SATURATION: 96 % | BODY MASS INDEX: 35.39 KG/M2 | HEIGHT: 73 IN

## 2021-10-21 PROCEDURE — 99215 OFFICE O/P EST HI 40 MIN: CPT

## 2021-10-21 NOTE — PHYSICAL EXAM
[] : septum deviated to the right [de-identified] : [facility] 3 [Normal] : orientation to person, place, and time: normal

## 2021-10-21 NOTE — DATA REVIEWED
[de-identified] : 7/21 CT reviewed w/ pt: R max sinus filled w/ hypodense material & widened max ostium; OMC completely opacified; R frontal sinus & ant ethmoids opacified. Wide R DNS impacting the OMC.  [de-identified] : 9/21 HST: no SDB

## 2021-10-21 NOTE — ASSESSMENT
[FreeTextEntry1] : Fully discussed perioperative care and the risks and benefits of sinus surgery, including the possible need for a septoplasty, and turbinoplasty if appropriate; this included but was not limited to cerebrospinal fluid leak, damage to the orbit, the need for more surgery, septal perforation, nosebleed, loss of sense of smell, infection, a saddle nose deformity. The patient expressed understanding and desires to proceed. An educational perioperative care handout was given with instructions to purchase a rinse kit.\[facility] Reference #: [id number]

## 2021-10-21 NOTE — HISTORY OF PRESENT ILLNESS
[de-identified] : Hx of constant sinus infections that seem to have improved in the last few years; infrequent discolored rhinorrhea. Ongoing chronic nasal dyspnea. Now f/u to preop for surgery.  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 \par Previously moderate SHELBI but stopped using his CPAP because he lost a lot of weight and sxs improved; some daytime sleepiness. HST w/ no SDB.

## 2021-10-27 ENCOUNTER — RESULT REVIEW (OUTPATIENT)
Age: 46
End: 2021-10-27

## 2021-10-27 ENCOUNTER — TRANSCRIPTION ENCOUNTER (OUTPATIENT)
Age: 46
End: 2021-10-27

## 2021-10-27 ENCOUNTER — APPOINTMENT (OUTPATIENT)
Age: 46
End: 2021-10-27
Payer: MEDICAID

## 2021-10-27 PROCEDURE — 31254 NSL/SINS NDSC W/PRTL ETHMDCT: CPT | Mod: RT

## 2021-10-27 PROCEDURE — 30520 REPAIR OF NASAL SEPTUM: CPT

## 2021-10-27 PROCEDURE — 31256 EXPLORATION MAXILLARY SINUS: CPT | Mod: RT

## 2021-10-27 PROCEDURE — 61782 SCAN PROC CRANIAL EXTRA: CPT

## 2021-11-10 ENCOUNTER — NON-APPOINTMENT (OUTPATIENT)
Age: 46
End: 2021-11-10

## 2021-11-18 ENCOUNTER — APPOINTMENT (OUTPATIENT)
Dept: OTOLARYNGOLOGY | Facility: CLINIC | Age: 46
End: 2021-11-18
Payer: MEDICAID

## 2021-11-18 VITALS
SYSTOLIC BLOOD PRESSURE: 124 MMHG | DIASTOLIC BLOOD PRESSURE: 81 MMHG | HEART RATE: 104 BPM | OXYGEN SATURATION: 95 % | WEIGHT: 265 LBS | BODY MASS INDEX: 35.12 KG/M2 | HEIGHT: 73 IN | TEMPERATURE: 98.3 F

## 2021-11-18 DIAGNOSIS — Z87.09 PERSONAL HISTORY OF OTHER DISEASES OF THE RESPIRATORY SYSTEM: ICD-10-CM

## 2021-11-18 PROCEDURE — 31237 NSL/SINS NDSC SURG BX POLYPC: CPT | Mod: 58,RT

## 2021-11-18 PROCEDURE — 99024 POSTOP FOLLOW-UP VISIT: CPT

## 2021-11-18 NOTE — HISTORY OF PRESENT ILLNESS
[de-identified] : s/p septo/FESS (R antrostomy & anterior eth'y) 10/27/21; doing well with much improved breathing and no facial pressure. Irrigating.

## 2021-11-18 NOTE — PROCEDURE
[FreeTextEntry6] : Indication: requirement for postoperative debridement\par After verbal consent and the administration of an aerosolized phenylephrine/lidocaine mix, examination and debridement was performed with a rigid 30 deg endoscope\par septum midline, healed well\par middle turb maintaining medialized position\par debridement performed bilaterally using suction and forceps as required; well-healing antrostomy and ethmoid cavity (R only)

## 2021-11-18 NOTE — ASSESSMENT
[FreeTextEntry1] : Doing well; further care discussed. Recommended an allergy eval as he has still not had one recently. \par RTC 3 wks; continue irrigating.

## 2021-12-09 ENCOUNTER — APPOINTMENT (OUTPATIENT)
Dept: OTOLARYNGOLOGY | Facility: CLINIC | Age: 46
End: 2021-12-09
Payer: MEDICAID

## 2021-12-09 VITALS
DIASTOLIC BLOOD PRESSURE: 81 MMHG | HEART RATE: 82 BPM | WEIGHT: 265 LBS | OXYGEN SATURATION: 97 % | BODY MASS INDEX: 35.12 KG/M2 | TEMPERATURE: 97.6 F | SYSTOLIC BLOOD PRESSURE: 129 MMHG | HEIGHT: 73 IN

## 2021-12-09 DIAGNOSIS — Z87.09 PERSONAL HISTORY OF OTHER DISEASES OF THE RESPIRATORY SYSTEM: ICD-10-CM

## 2021-12-09 DIAGNOSIS — Z48.89 ENCOUNTER FOR OTHER SPECIFIED SURGICAL AFTERCARE: ICD-10-CM

## 2021-12-09 PROCEDURE — 31231 NASAL ENDOSCOPY DX: CPT | Mod: 58

## 2021-12-09 PROCEDURE — 99024 POSTOP FOLLOW-UP VISIT: CPT

## 2021-12-09 NOTE — PROCEDURE
[FreeTextEntry6] : We discussed the elevated risk of liberation of viral particles from the nasopharynx if the patient were to be asymptomatically infected with COVID-19; after weighing the risks & benefits the decision was made to proceed. The procedure was performed while wearing appropriate PPE and a camera attached to a video system was used to maximize separation from the patient. The scope was handled appropriately. \par Indication: requirement for exam not possible via anterior rhinoscopy; f/u ESS\par After verbal consent and the administration of a small amount of an aerosolized phenylephrine/lidocaine mix, examination was performed with a flexible endoscope. Findings:\par Septum: without significant deviation or impingement on other anatomic structures\par Mucosa: normal\par Polyposis: not present\par Inferior turbinates: unremarkable\par Middle and superior turbinates: remnant on the R, nl on the L\par Inferior meatus: unremarkable\par Middle meatus: patent antrost & ant eth'y\par Superior meatus: unremarkable\par Speno-ethmoidal recess: unremarkable\par Nasopharynx: unremarkable\par Secretions: scant L mucoid\par Other findings: none

## 2021-12-23 ENCOUNTER — RX RENEWAL (OUTPATIENT)
Age: 46
End: 2021-12-23

## 2021-12-28 ENCOUNTER — APPOINTMENT (OUTPATIENT)
Dept: INTERNAL MEDICINE | Facility: CLINIC | Age: 46
End: 2021-12-28

## 2022-01-07 ENCOUNTER — APPOINTMENT (OUTPATIENT)
Dept: INTERNAL MEDICINE | Facility: CLINIC | Age: 47
End: 2022-01-07
Payer: COMMERCIAL

## 2022-01-07 VITALS
BODY MASS INDEX: 34.46 KG/M2 | TEMPERATURE: 98.24 F | SYSTOLIC BLOOD PRESSURE: 132 MMHG | OXYGEN SATURATION: 95 % | DIASTOLIC BLOOD PRESSURE: 84 MMHG | WEIGHT: 260 LBS | HEIGHT: 73 IN | HEART RATE: 97 BPM

## 2022-01-07 DIAGNOSIS — E34.9 ENDOCRINE DISORDER, UNSPECIFIED: ICD-10-CM

## 2022-01-07 DIAGNOSIS — Z13.1 ENCOUNTER FOR SCREENING FOR DIABETES MELLITUS: ICD-10-CM

## 2022-01-07 DIAGNOSIS — I10 ESSENTIAL (PRIMARY) HYPERTENSION: ICD-10-CM

## 2022-01-07 DIAGNOSIS — Z00.00 ENCOUNTER FOR GENERAL ADULT MEDICAL EXAMINATION W/OUT ABNORMAL FINDINGS: ICD-10-CM

## 2022-01-07 DIAGNOSIS — Z11.3 ENCOUNTER FOR SCREENING FOR INFECTIONS WITH A PREDOMINANTLY SEXUAL MODE OF TRANSMISSION: ICD-10-CM

## 2022-01-07 DIAGNOSIS — E78.6 LIPOPROTEIN DEFICIENCY: ICD-10-CM

## 2022-01-07 DIAGNOSIS — R29.818 OTHER SYMPTOMS AND SIGNS INVOLVING THE NERVOUS SYSTEM: ICD-10-CM

## 2022-01-07 DIAGNOSIS — K21.9 GASTRO-ESOPHAGEAL REFLUX DISEASE W/OUT ESOPHAGITIS: ICD-10-CM

## 2022-01-07 DIAGNOSIS — E66.9 OBESITY, UNSPECIFIED: ICD-10-CM

## 2022-01-07 PROCEDURE — 90686 IIV4 VACC NO PRSV 0.5 ML IM: CPT

## 2022-01-07 PROCEDURE — G0008: CPT

## 2022-01-07 PROCEDURE — 99396 PREV VISIT EST AGE 40-64: CPT | Mod: 25

## 2022-01-07 PROCEDURE — G0447 BEHAVIOR COUNSEL OBESITY 15M: CPT

## 2022-01-07 RX ORDER — OMEPRAZOLE 20 MG/1
20 CAPSULE, DELAYED RELEASE ORAL DAILY
Qty: 30 | Refills: 5 | Status: ACTIVE | COMMUNITY
Start: 2021-07-01

## 2022-01-07 RX ORDER — SILDENAFIL 100 MG/1
100 TABLET, FILM COATED ORAL
Qty: 6 | Refills: 2 | Status: DISCONTINUED | COMMUNITY
Start: 2017-12-07 | End: 2022-01-07

## 2022-01-07 RX ORDER — OXYCODONE AND ACETAMINOPHEN 5; 325 MG/1; MG/1
5-325 TABLET ORAL
Qty: 10 | Refills: 0 | Status: DISCONTINUED | COMMUNITY
Start: 2021-06-02 | End: 2022-01-07

## 2022-01-07 RX ORDER — SERTRALINE HYDROCHLORIDE 50 MG/1
50 TABLET, FILM COATED ORAL DAILY
Qty: 90 | Refills: 3 | Status: ACTIVE | COMMUNITY
Start: 2021-01-27 | End: 1900-01-01

## 2022-01-07 RX ORDER — HYDROCODONE BITARTRATE AND ACETAMINOPHEN 5; 325 MG/1; MG/1
5-325 TABLET ORAL
Qty: 15 | Refills: 0 | Status: DISCONTINUED | COMMUNITY
Start: 2021-10-21 | End: 2022-01-07

## 2022-01-10 LAB
ABO + RH PNL BLD: NORMAL
ALBUMIN SERPL ELPH-MCNC: 4.8 G/DL
ALP BLD-CCNC: 94 U/L
ALT SERPL-CCNC: 21 U/L
ANION GAP SERPL CALC-SCNC: 12 MMOL/L
APPEARANCE: CLEAR
AST SERPL-CCNC: 33 U/L
BILIRUB SERPL-MCNC: 0.8 MG/DL
BILIRUBIN URINE: NEGATIVE
BLOOD URINE: NEGATIVE
BUN SERPL-MCNC: 20 MG/DL
CALCIUM SERPL-MCNC: 9.5 MG/DL
CHLORIDE SERPL-SCNC: 103 MMOL/L
CHOLEST SERPL-MCNC: 199 MG/DL
CO2 SERPL-SCNC: 22 MMOL/L
COLOR: YELLOW
CREAT SERPL-MCNC: 0.97 MG/DL
ESTIMATED AVERAGE GLUCOSE: 97 MG/DL
GLUCOSE QUALITATIVE U: NEGATIVE
GLUCOSE SERPL-MCNC: 106 MG/DL
HBA1C MFR BLD HPLC: 5 %
HBV SURFACE AB SER QL: REACTIVE
HBV SURFACE AG SER QL: NONREACTIVE
HCV AB SER QL: NONREACTIVE
HCV S/CO RATIO: 0.09 S/CO
HDLC SERPL-MCNC: 47 MG/DL
HIV1+2 AB SPEC QL IA.RAPID: NONREACTIVE
KETONES URINE: NEGATIVE
LDLC SERPL CALC-MCNC: 115 MG/DL
LEUKOCYTE ESTERASE URINE: NEGATIVE
NITRITE URINE: NEGATIVE
NONHDLC SERPL-MCNC: 153 MG/DL
PH URINE: 6.5
POTASSIUM SERPL-SCNC: 4.1 MMOL/L
PROT SERPL-MCNC: 7 G/DL
PROTEIN URINE: NEGATIVE
SODIUM SERPL-SCNC: 137 MMOL/L
SPECIFIC GRAVITY URINE: 1.02
T PALLIDUM AB SER QL IA: NEGATIVE
TRIGL SERPL-MCNC: 187 MG/DL
UROBILINOGEN URINE: NORMAL

## 2022-01-11 LAB
TESTOSTERONE FREE/WEAKLY BND: 49.8 NG/DL
TESTOSTERONE TOTAL S: 408 NG/DL
TESTOSTERONE, % FREE/WEAKLY BND: 12.2 %

## 2022-03-04 ENCOUNTER — APPOINTMENT (OUTPATIENT)
Dept: PHYSICAL MEDICINE AND REHAB | Facility: CLINIC | Age: 47
End: 2022-03-04
Payer: COMMERCIAL

## 2022-03-04 VITALS
BODY MASS INDEX: 34.46 KG/M2 | HEIGHT: 73 IN | SYSTOLIC BLOOD PRESSURE: 137 MMHG | DIASTOLIC BLOOD PRESSURE: 90 MMHG | WEIGHT: 260 LBS | HEART RATE: 79 BPM | OXYGEN SATURATION: 94 %

## 2022-03-04 DIAGNOSIS — M54.50 LOW BACK PAIN, UNSPECIFIED: ICD-10-CM

## 2022-03-04 DIAGNOSIS — G89.29 LOW BACK PAIN, UNSPECIFIED: ICD-10-CM

## 2022-03-04 PROCEDURE — 99204 OFFICE O/P NEW MOD 45 MIN: CPT

## 2022-03-04 RX ORDER — METHOCARBAMOL 750 MG/1
750 TABLET, FILM COATED ORAL
Qty: 30 | Refills: 1 | Status: ACTIVE | COMMUNITY
Start: 2022-03-04 | End: 1900-01-01

## 2022-03-04 NOTE — HISTORY OF PRESENT ILLNESS
[FreeTextEntry1] : Referring Physician: Rene Peng .MD\par \par Mr. JEREMIAH VEE is a very pleasant 46 year male who comes in for evaluation of Lower Back Pain  that has been ongoing for years without any specific injury or inciting event. Patient has tried epidurals, Meloxicam, oxycodone which help relief. The pain is located primarily right lower back non-radiating intermittent and described as sharp. The pain is rated as 1/10 and ranges from 0-9/10. The patient's symptoms are aggravated by movement  and alleviated by laying down, Meloxicam . The patient works as a  which consists of sitting. The patient denies any night pain, numbness/tingling, weakness, or bowel/bladder dysfunction. The patient has no other complaints at this time.\par

## 2022-03-04 NOTE — PHYSICAL EXAM
[FreeTextEntry1] : LUMBAR\par GEN: AAOx3. NAD.\par INSP: Spine alignment midline. No evidence of scoliosis.\par STANCE: Trendelenburg/SLS (-) R (-) L. \par GAIT: (-) Antalgic. Normal reciprocating heel to toe\par SENS: Grossly intact to LT BLE.\par REFLEXES: Symmetric patella, achilles. \par TONE: Normal. No clonus.\par SPECIAL: SLR/Slump (-) R (-) L.   Gaenslen (-) R (-) L.\par                 FADIR (-) R (-) L.          NICOLA (-) R (-) L.  \par PALP: Midline/Spinous processes (-).   Paraspinals (+) R  (-) L.   \par            QL (+) R (-) L.      SIJ (+) R (-) L.            GTB (-) R (-) L.\par \par LSpine          ROM    Axial Sx    LE Sx \par Flex               Full       (-)           R (-) L (-).\par Ext                 Full       (-)           R (-) L (-).\par Lat Flex        Limited   (+)           R (-) L (-).       \par Rotation        Limited  (+)           R (-) L (-). \par Oblique Ext    Full       (-)           R (-) L (-).  \par \par HIP ROM:    RIGHT   Sx        LEFT   Sx\par Flex            Limited   (-)         Full     (-)\par IR                 Full       (-)        Full     (-)\par ER              Limited   (+)        Full     (-)\par \par STRENGTH:    RIGHT      LEFT\par Hip Flex            5/5 5/5\par Hip ABd            5/5 5/5\par Knee Ext           5/5 5/5\par Ankle DF           5/5 5/5\par Ankle PF           5/5 5/5\par EHL                   5/5 5/5\par EV                    5/5 5/5

## 2022-03-04 NOTE — DATA REVIEWED
[Plain X-Rays] : plain X-Rays [FreeTextEntry1] : XR LUMBAR SPINE 11-: Degenerative facet joints at L4-5 and L5-S1. Disc space narrowing most significant at L5-S1.

## 2022-03-10 ENCOUNTER — APPOINTMENT (OUTPATIENT)
Dept: OTOLARYNGOLOGY | Facility: CLINIC | Age: 47
End: 2022-03-10
Payer: COMMERCIAL

## 2022-03-10 VITALS
SYSTOLIC BLOOD PRESSURE: 138 MMHG | WEIGHT: 260 LBS | BODY MASS INDEX: 34.46 KG/M2 | HEIGHT: 73 IN | TEMPERATURE: 206.42 F | DIASTOLIC BLOOD PRESSURE: 83 MMHG | HEART RATE: 98 BPM | OXYGEN SATURATION: 95 %

## 2022-03-10 DIAGNOSIS — J30.2 OTHER SEASONAL ALLERGIC RHINITIS: ICD-10-CM

## 2022-03-10 PROCEDURE — 31231 NASAL ENDOSCOPY DX: CPT

## 2022-03-10 PROCEDURE — 99212 OFFICE O/P EST SF 10 MIN: CPT | Mod: 25

## 2022-03-10 NOTE — PROCEDURE
[FreeTextEntry6] : We discussed the elevated risk of liberation of viral particles from the nasopharynx if the patient were to be asymptomatically infected with COVID-19; after weighing the risks & benefits the decision was made to proceed. The procedure was performed while wearing appropriate PPE and a camera attached to a video system was used to maximize separation from the patient. The scope was handled appropriately. \par Indication: requirement for exam not possible via anterior rhinoscopy; f/u ESS\par After verbal consent and the administration of a small amount of an aerosolized phenylephrine/lidocaine mix, examination was performed with a flexible endoscope. Findings:\par Septum: without significant deviation or impingement on other anatomic structures\par Mucosa: normal\par Polyposis: not present\par Inferior turbinates: unremarkable\par Middle and superior turbinates: remnant on the R, nl on the L\par Inferior meatus: unremarkable\par Middle meatus: patent antrost & ant eth'y\par Superior meatus: unremarkable\par Speno-ethmoidal recess: unremarkable\par Nasopharynx: unremarkable\par Secretions: crusts anterior-only L>R\par Other findings: none

## 2022-03-10 NOTE — HISTORY OF PRESENT ILLNESS
[de-identified] : s/p septo/FESS (R antrostomy & anterior eth'y) 10/27/21 for CS; doing well with excellent breathing and no facial pressure. Irrigating. \par Birch allergy which doesn't bother him in the [place]; o/w ok

## 2022-03-10 NOTE — PHYSICAL EXAM
[Nasal Endoscopy Performed] : nasal endoscopy was performed, see procedure section for findings [Normal] : assessment of respiratory effort is normal [de-identified] : L>R anterior dried secretions

## 2022-03-11 ENCOUNTER — APPOINTMENT (OUTPATIENT)
Dept: PULMONOLOGY | Facility: CLINIC | Age: 47
End: 2022-03-11
Payer: COMMERCIAL

## 2022-03-11 DIAGNOSIS — R06.83 SNORING: ICD-10-CM

## 2022-03-11 DIAGNOSIS — Z86.69 PERSONAL HISTORY OF OTHER DISEASES OF THE NERVOUS SYSTEM AND SENSE ORGANS: ICD-10-CM

## 2022-03-11 PROCEDURE — 99443: CPT

## 2022-03-11 NOTE — CONSULT LETTER
[Dear  ___] : Dear  [unfilled], [Consult Letter:] : I had the pleasure of evaluating your patient, [unfilled]. [Please see my note below.] : Please see my note below. [Consult Closing:] : Thank you very much for allowing me to participate in the care of this patient.  If you have any questions, please do not hesitate to contact me. [Sincerely,] : Sincerely, [FreeTextEntry3] : Deloris Urbina MD\par \par [employer] [facility]\par Pulmonary, Critical Care, and Sleep Medicine\par

## 2022-03-11 NOTE — REVIEW OF SYSTEMS
[Negative] : Psychiatric [Difficulty Initiating Sleep] : no difficulty falling asleep [Lower Extremity Discomfort] : no lower extremity discomfort [Late day/ Evening symptoms] : no late day/evening symptoms

## 2022-03-11 NOTE — HISTORY OF PRESENT ILLNESS
[Home] : at home, [unfilled] , at the time of the visit. [Medical Office: ([facility])___] : at the medical office located in  [Verbal consent obtained from patient] : the patient, [unfilled] [FreeTextEntry1] : 45yo man with snoring. Hx of SHELBI years ago. Had recent sinus surgery. HST before surgery did not show SHELBI. Snoring is very disruptive.  Has gained weight. [ESS] : 6

## 2022-03-11 NOTE — ASSESSMENT
[FreeTextEntry1] : REVIEWED\par HST 2021: AHI 1.2; phyllis o2 saturation 87\par \par 47yo with snoring, obesity referred by Dr. Hernadez for evaluation of snoring. HST in 2021 did not show significant SHELBI. Has had sinus surgery since then but still snoring. Also hx of SHELBI in the past. Indicated for an attended PSG. Referred to the [facility]. Follow up after PSG is resulted.

## 2022-04-14 NOTE — HISTORY OF PRESENT ILLNESS
Post Acute Skilled Nursing Home Initial Visit Note     Date of Service: 4/12/2022  Location seen at: [facility] SKILLED NURSING  Subacute / Skilled Need: Rehabilitation and Vent weaning    PCP: Jakob Obando MD   Patient Care Team:  Jakob Obando MD as PCP - General (Internal Medicine)  Joe Wills DPM as Specialist (Podiatry)  Doris Young RN as [facility] at Home: RN (Registered Nurse)  Doris Carballo MD as General & Vascular Surgery (General Surgery)  Pan Luis MD as Radiation Oncology (Radiation Oncology)  Lela Godfrey MD as Oncologist (Internal Medicine)  Sanaz Sheffield MD as General Surgeon (General Surgery)  Citlaly Porter CNP as [facility] at Home: Clinician (Nurse Practitioner - Adult Health)  Melanie Bui RN as Care Transitions Nurse (/Care Coordinator)  Jakob Obando MD as Post Acute Facility Provider: Physician (Internal Medicine)  Charisma Ardon CNP as Post Acute Facility Provider: APC (Nurse Practitioner)  Seen by Charisma Ardon CNP today    GSH: 3/15- 3/17 recurrent anemia    GSH: 3/1-3/6, anemia, AVM's duodenum and stomach and HCAP    [facility]: 2/23-2/25, anemia, s/p EGD and PRBCs    [employer] [place]: 1/3-2/18, bacteremia secondary to port infection and HAP, cardiac arrest trach and PEG    History of Present Illness:   Xiomy Santy is a 78 year old female with a history of hypertension, hyperlipidemia, breast cancer currently on chemotherapy with last chemo 12/29.  CVA with residual right-sided weakness, EILEEN on CPAP and obesity who presented to the emergency room with shortness of breath and constipation x4 days.  In the emergency room patient tested negative for Covid however follow-up PCR was +1/6.  CT chest with possible infection to the lungs.  Patient received ceftriaxone and azithromycin.    CTA revealed a left breast mass measuring at least 3.6 cm, an enlarged left axillary lymph node, mediastinal and right  hilar lymph nodes as well as in indeterminate 3 mm nodule in the right upper lobe.  Additionally, a small right pleural effusion and patchy groundglass opacities in the right lower lobe.    Patient required high flow O2 and intubation 1/10. Was extubated and transferred out of the ICU. However 2/5 Code Blue, she required CPR and re intubated.      Treated for Klebsiella bacteremia secondary to port infection.  Status post port removal on 2/11 with IR.  Patient to continue on IV Rocephin through 2/24.  Patient status post trach and PEG 2/9.Unable to be weaned from vent.    For abdominal pain and constipation CT abdomen on admission with no acute intra-abdominal or pelvic abnormality.  Mild bilateral hydroureteronephrosis and moderately distended urinary bladder.  Uncomplicated cholelithiasis    Patient had been recieveing IV Dilaudid and IV MS duriong hospitalization which have been stopped.    2/18- Transitioned to [facility] for LAURENCE and ongoing vent weaning. Here with Tramadol only.     2/23- Sent to ER for Hgb 6.1, transfused EGD done without intervention and returned to [facility] 2/25. Initial labs at [facility] with Hgb 6.1 and sent back to [facility] 3/1. S/p EGD found 7 AVMs in the gastrium and duodenum which were controlled with APC  HCAP with Senotrophomonas maltophilia and Corynebacterium striatum growing in sputum cultures. Conitnue Levofloxacin for 6 more days and Vanco for 10 more days.    3/6- Returned to [facility] for LAURENCE and possible vent weaning.    3/15- returned to [facility] for anemia. Heme pos stools. Eval by GI. Received transfusion. Lovenox stopped and hgb appeared stable. Patient returned to [place] 3/17.          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  All treated with APC successfully.   • AVM (arteriovenous malformation) of  stomach, acquired with hemorrhage 3/5/2022    EGD performed Mar 01, 2022:  AVM x 7 with stigmata of bleeding, all Tx with APC   • Calcified granuloma of lung (CMS/[facility]) 1/13/2016   • Constipation    • COPD (chronic obstructive pulmonary disease) (CMS/[facility])    • Debility    • Depression    • Falls 8/9/2018   • Head congestion 6/17/2020   • HLD (hyperlipidemia)    • Hypertensive heart disease with congestive heart failure (CMS/[facility])    • Hypothyroidism    • Impacted cerumen 8/9/2018   • Insomnia 1/14/2014   • Itching 1/22/2020   • Left foot pain 8/5/2020   • Major depressive disorder, recurrent, mild (CMS/[facility]) 4/30/2015   • Morbid obesity with BMI of 50.0-59.9, adult (CMS/[facility])    • Morbid obesity with BMI of 60.0-69.9, adult (CMS/[facility]) 1/22/2020   • OA (osteoarthritis)    • Open wound of right lower extremity 6/22/2021   • Primary osteoarthritis of both knees 10/6/2015   • PVD (peripheral vascular disease) (CMS/[facility])    • Uncomplicated opioid dependence (CMS/[facility]) 10/24/2019   • Vitamin D deficiency 8/15/2018   • Weakness 3/26/2018      reports that she quit smoking about 15 years ago. Her smoking use included cigarettes. She smoked 0.00 packs per day for 15.00 years. She has never used smokeless tobacco. She reports that she does not drink alcohol and does not use drugs.  Past Surgical History:   Procedure Laterality Date   • Joint replacement  1998 R, 2008 L    bilateral knee replacements     No family history on file.  History     Not marked as reviewed during this visit.          PROBLEM LIST:  Patient Active Problem List   Diagnosis   • ACP (advance care planning)   • Asthma   • Bradycardia   • Constipation   • Debility   • HLD (hyperlipidemia)   • Hypothyroidism   • Leg pain   • Low back pain   • EILEEN (obstructive sleep apnea)   • Primary osteoarthritis of both knees   • PVD (peripheral vascular disease) (CMS/[facility])   • Hypertensive heart disease with heart failure (CMS/[facility])   • Itching   • Sacral ulcer, limited to  [FreeTextEntry1] : Wishes to discuss necessary vaccines for travel to either [place], [place], [place], or [place]..\par Hasn't yet decided which of these countries he is going to go to. breakdown of skin (CMS/[facility])   • Open wound of right lower extremity   • Oxygen dependent   • Lump or mass in breast   • Malignant neoplasm of upper-inner quadrant of left breast in female, estrogen receptor negative (CMS/[facility])   • Morbid obesity with BMI of 50.0-59.9, adult (CMS/[facility])   • Acute hypoxemic respiratory failure due to COVID-19 (CMS/[facility])   • Anemia due to GI blood loss   • Chronic obstructive pulmonary disease (CMS/[facility])   • Chronic diastolic heart failure (CMS/[facility])   • Chronic respiratory failure (CMS/[facility])   • Multiple wounds   • Enterococcal bacteremia   • Healthcare-associated pneumonia   • History of tracheostomy   • PEG (percutaneous endoscopic gastrostomy) status (CMS/[facility])   • Oropharyngeal dysphagia   • AVM (arteriovenous malformation) of duodenum, acquired with hemorrhage   • AVM (arteriovenous malformation) of stomach, acquired with hemorrhage   • Neuropathic pain of both legs   • Anemia   • Mild major depression (CMS/[facility])   • Leukocytosis   • UTI due to extended-spectrum beta lactamase (ESBL) producing Escherichia coli       ADVANCE CARE PLANNING:      Does the patient have a state-issued Ambulatory Code Status ([place]-POLST [place]-DNR) order: No  After discussion, the patient has decided on Full Resuscitation         DEPRESSION SCREENING:  Recent PHQ 2/9 Score    PHQ 2:  Date Able to Complete Adult PHQ 2 Score Adult PHQ 2 Interpretation   3/29/2022 - 0 No further screening needed       PHQ 9:  Date Able to Complete   5/17/2021 No       DEPRESSION ASSESSMENT/PLAN:  Hx of depression of medication, consult psych     ALLERGIES:  Allergies as of 04/12/2022 - Reviewed 04/01/2022   Allergen Reaction Noted   • Trazodone RASH 05/08/2019       CURRENT MEDICATIONS:   Current Outpatient Medications   Medication Sig Dispense Refill   • amoxicillin-clavulanate (AUGMENTIN) 875-125 MG per tablet Take 1 tablet by mouth 2 times daily.     • gabapentin (NEURONTIN) 300 MG capsule 1 capsule by Per G Tube route 3 times  daily. 90 capsule 0   • ferrous sulfate 220 (44 Fe) MG/5ML liquid 7.5 mLs 2 times daily.     • traMADol (ULTRAM) 50 MG tablet 1 tablet by PEG Tube route every 4 hours as needed for Pain. 20 tablet 0   • ipratropium-albuterol (DUONEB) 0.5-2.5 (3) MG/3ML nebulizer solution Take 3 mLs by nebulization every 8 hours.     • pantoprazole (Protonix) 40 MG packet for oral suspension 40 mg by Per G Tube route 2 times daily.     • melatonin 3 MG 3 mg by Per G Tube route at bedtime as needed (sleep).      • ALPRAZolam (XANAX) 0.25 MG tablet 0.25 mg by PEG Tube route every 12 hours as needed for Anxiety.      • zolpidem (AMBIEN) 5 MG tablet 5 mg by Per G Tube route nightly as needed for Sleep.      • levothyroxine 100 MCG tablet Take 1 tablet by mouth daily (before breakfast). Do not start before March 7, 2022. (Patient taking differently: 100 mcg by PEG Tube route daily (before breakfast). ) 30 tablet 0   • folic acid (FOLATE) 1 MG tablet Take 1 tablet by mouth daily. Do not start before March 7, 2022. (Patient taking differently: 1 mg by Per G Tube route daily. ) 30 tablet 0   • sucralfate (CARAFATE) 1 GM/10ML suspension 1 dose q 6 hrs; hold TF for 90 minutes before and 30 minutes after giving the dose. (Patient taking differently: Take 1 g by mouth 4 times daily. 1 dose q 6 hrs; hold TF for 90 minutes before and 30 minutes after giving the dose.) 1 each 0   • senna (SENOKOT) 8.6 MG tablet 2 tablets by Per G Tube route 2 times daily as needed for Constipation.     • zinc oxide 20 % ointment Apply 1 application topically as needed for Skin Protectant. Every shift and as needed     • polyethylene glycol (MIRALAX) 17 g packet 17 g at bedtime. G-tube. Stir and dissolve powder in any 4 to 8 ounces of beverage      • nystatin (MYCOSTATIN) 499490 UNIT/ML suspension Take 500,000 Units by mouth 4 times daily.      • sodium chloride 0.9 % nebulizer solution Take 10 mLs by nebulization as needed (trach care). With 15ml NEB blast nacl prn      • docusate (COLACE) 60 MG/15ML syrup 200 mg nightly. G-tube     • cholecalciferol (VITAMIN D) 25 mcg (1,000 units) tablet 25 mcg by Per G Tube route daily.      • acetaminophen (TYLENOL) 160 MG/5ML suspension 640 mg by Per G Tube route every 6 hours as needed for Fever or Pain.      • budesonide (PULMICORT) 0.5 MG/2ML nebulizer suspension Take 0.5 mg by nebulization 2 times daily.     • losartan-hydrochlorothiazide (HYZAAR) 100-12.5 MG per tablet Take 1 tablet by mouth daily. (Patient taking differently: 1 tablet daily. G-tube) 90 tablet 3   • montelukast (SINGULAIR) 10 MG tablet Take 1 tablet by mouth daily. (Patient taking differently: 10 mg by Per G Tube route nightly. ) 90 tablet 3   • simvastatin (ZOCOR) 40 MG tablet Take 1 tablet by mouth daily. (Patient taking differently: 40 mg by Per G Tube route nightly. ) 90 tablet 3     No current facility-administered medications for this visit.     Medications reviewed / reconciled: Yes    BASELINE FUNCTIONAL STATUS:  Independent, but has been in facility on vent since January  3/15-bed mobility total, transfers Reji, able to sit on edge of bed for 10 minutes with max assist x2.  Tolerating up in Kimberly chair.  Tolerating pressure support trials up to 5 hours.    CURRENT FUNCTIONAL STATUS:  4/12-Discussed in IDT bed mobility and transfers total, not ambulatory, toileting and ADLs total.  Plan transition to long-term care 4/14 however high probability of readmission due to ongoing anemia.    4/4-Discussed in IDT, bed mobility and transfers total, transfers total, toileting and ADLs total.  Plan DC from subacute rehab 4/14 with recommendation for long-term care placement    3/24- total to dependent for all care.     DIET:  Consistency: Enteral  Type: TF  Appetite: Poor    REVIEW OF SYSTEMS:  Review of Systems   Constitutional: Negative for chills, diaphoresis and fever.   HENT: Negative for trouble swallowing.    Eyes: Negative for discharge and visual disturbance.    Respiratory: Negative for cough and shortness of breath.    Cardiovascular: Negative for chest pain, palpitations and leg swelling.   Gastrointestinal: Negative for abdominal distention, abdominal pain, constipation, diarrhea, nausea and vomiting.   Genitourinary: Negative for difficulty urinating and dysuria.   Neurological: Negative for dizziness and headaches.        Tingling pain bilateral lower extremities minimally improved on Neurontin 200 mg 3 times daily   Psychiatric/Behavioral: Negative for sleep disturbance.       VITALS:  Vitals:    04/12/22 1646   BP: 122/64   Pulse: 63   Resp: 18   Temp: 97.8 °F (36.6 °C)   SpO2: 99%   PainSc:  0       PHYSICAL ASSESSMENT:  Physical Exam  Constitutional:       General: She is not in acute distress.  HENT:      Head: Normocephalic.      Neck: Neck supple.   Eyes:      General:         Right eye: No discharge.         Left eye: No discharge.      Conjunctiva/sclera: Conjunctivae normal.   Neck:      Vascular: No JVD.      Comments: trach  Cardiovascular:      Rate and Rhythm: Normal rate and regular rhythm.      Heart sounds: S1 normal and S2 normal. No murmur heard.  Pulmonary:      Effort: Pulmonary effort is normal. No respiratory distress.      Breath sounds: No wheezing, rhonchi or rales.   Abdominal:      General: Bowel sounds are normal. There is no distension.      Palpations: Abdomen is soft.      Tenderness: There is no abdominal tenderness. There is no guarding.      Comments: PEG     Skin:     General: Skin is warm and dry.      Coloration: Skin is not pale.   Neurological:      Mental Status: She is alert.      Comments: Alert, responsive, answers questions appropriately.    Psychiatric:         Mood and Affect: Mood normal.         LABS:  4/12  CBC pending  Sodium 138, potassium 3.8, BUN 33, creatinine 0.6    4/11  WBC 11.6, hemoglobin 6.7, platelets 311  Sodium 130, potassium 4.1, BUN 35, creatinine 0.6  Magnesium 2.0, phosphorus 3.5    4/6  Sputum C&S  positive for Proteus mirabilis susceptible to Augmentin    4/5  WBC 12.1, hemoglobin 7.0, platelets 248  Sodium 135, potassium 3.9, BUN 26, creatinine 0.7  Magnesium 1.6    4/4  WBC 11.9, hemoglobin 7.2, platelets 246   BUN 25, creatinine 0.6  Magnesium 1.9, phosphorus 2.7  UA C&S E.coli ESBL  CXR: Moderate decreased alveolar opacity since prior study consistent with minimal decrease in CHF or inflammatory disease.    3/24  WBC 8.9, hemoglobin 7.0, platelets 310    3/22  WBC 9.6, hemoglobin 6.8, platelets 311  Iron, serum 31, , percent iron saturation 12.9  Ferritin 754    3/21  WBC 7.7, hemoglobin 6.2, platelets 276  Sodium 139, potassium 4.3, BUN 25, creatinine 0.6  Magnesium 2.0, phosphorus 3.3    3/18  WBC 8.6, hemoglobin 7.1, platelets 251  Sodium 138, potassium 3.9, BUN 27, creatinine 0.6  Magnesium 1.9, phosphorus 4.3    ASSESSMENT AND PLAN  Assessment   Patient seen and examined, reviewed the loss of a family member.  Patient has been having more difficulty sleeping.  Will increase Ambien to 10 mg nightly.  Psych is following.  Reviewed sputum culture with pulmonary will extend Augmentin for a total of 10 days  Reviewed with nursing, no acute concerns.        - leukocytosis 2/2 ESBL UTI and now with Proteus mirabilis in the sputum  sputum culture- pending  Augmentin 875 twice daily for 10 days  Monitor    -Peripheral neuropathy  Continue Neurontin to 300 mg 3 times daily    - Insomnia/anxiety  Failed melatonin  Continue Xanax 0.25 BID PRN and Ambien 5 mg at bedtime PRN  Consult psych      - Recurrent anemia 2/2 GIB-S/p EGD found 7 AVMs in the gastrium and duodenum which were controlled with APC  Patient did receive 3 full doses of Venofer while inpatient.  continue to monitor  Reviewed with attending will check iron levels for ongoing iron infusions and consider Epogen    - HCAP with Senotrophomonas maltophilia and Corynebacterium striatum growing in sputum cultures  Resolved  Completed Levofloxacin  for 6 more days and Vanco for 10 more days.  Monitor    -Acute on chronic hypoxic hypercarbic respiratory failure  Vent per pulmonary  PS trials  duo nebs every 6 hours as needed and budesonide twice daily  Sodium chloride nebs as needed for trach care  Continue Singulair    - HTN  Appears controlled, CPM    - bradycardia  Asymptomatic  meds reviewed not on any HR lowering medication  monitor    -Hypothyroidism  Continue levothyroxine    -Morbid obesity    - Breast CA, on chemo  F/u Oncology when more mobile    - wounds prior to hospitalization  Consult wound care     - PVD  asymptomatic    - DVT prophylaxis  Off AC 2/2 GIB  SCDs        FOLLOW UP APPOINTMENTS:  No future appointments.    DISCHARGE PLANNING:     Prognosis: fair    Discussed with: RN / Nursing, Patient, Consultant, SW/ and PT    Barriers to discharge: placement pending    Anticipated disposition: Extended Care Facility    Total time spent is more than 35 minutes, with more than 50% of the time spent in coordination of care, counseling, review of records and discussion of plan of care with the patient /staff /family.    Charisma Ardon, CNP

## 2022-08-01 ENCOUNTER — RX RENEWAL (OUTPATIENT)
Age: 47
End: 2022-08-01

## 2022-08-01 RX ORDER — FAMOTIDINE 20 MG/1
20 TABLET, FILM COATED ORAL
Qty: 180 | Refills: 3 | Status: ACTIVE | COMMUNITY
Start: 2021-01-27 | End: 1900-01-01

## 2022-08-23 RX ORDER — OMEGA-3-ACID ETHYL ESTERS CAPSULES 1 G/1
1 CAPSULE, LIQUID FILLED ORAL
Qty: 120 | Refills: 0 | Status: DISCONTINUED | COMMUNITY
Start: 2022-03-07 | End: 2022-08-23

## 2022-08-23 RX ORDER — AMOXICILLIN AND CLAVULANATE POTASSIUM 875; 125 MG/1; MG/1
875-125 TABLET, COATED ORAL
Qty: 20 | Refills: 0 | Status: DISCONTINUED | COMMUNITY
Start: 2021-07-01 | End: 2022-08-23

## 2022-08-23 RX ORDER — BACITRACIN 500 [IU]/G
500 OINTMENT TOPICAL
Qty: 1 | Refills: 0 | Status: DISCONTINUED | COMMUNITY
Start: 2021-10-21 | End: 2022-08-23

## 2022-09-12 NOTE — HISTORY OF PRESENT ILLNESS
9/14/21 --  TC- 156, TG- 102 , HDL-  48,  LDL- 88. Patient on Fenofibrate 160 mg daily. At 6/20/22 office visit - Dr. Amber Acosta recommended stopping Fenofibrate for now and starring Rosuvastatin 10 mg.      9/10/22 labs : TC- 116, TG- 104, HDL- 49, LDL- 46. Glucose 118. Liver enzymes normal.      Left voice mail message for patient asking for call back to discuss lab results.
Left voice mail message for patient instructing her to continue Rosuvastatin, remain off Fenofibrate, repeat fasting lipids, AST, ALT in six months. Instructed to call the office with questions. Lab orders mailed to patient's home.
Patient calling back to discuss results with Mrs Urban Mireles. She was informed that she is in office and will try her back alter today.
Spoke with patient and reviewed lab results. She confirmed that she remains off Fenofibrate which was stopped at her June visit. She is asking when fasting lipids should be repeated and if she should remain off Fenofibrate for now. FYI for Dr. Jennifer Zimmerman :   Patient states ASA (bleeding ) and Amlodipine (hypotension) were stopped after her May 2022 hospitalization for GI bleeding. She thinks the bleeding was caused by patient taking Ibuprofen. She thinks the bleeding was caused by taking NSAIDS (Ibuprofen). She has since resumed aspirin because of the benefits should receives from ASA given her hx of coronary artery calcification and atherosclerosis of aortic arch and descending thoracic aorta. She has not had recurrent bleeding issues. She also resumed Amlodipine 7.5 mg daily (take one 2.5 mg tablet with one 5 mg tablet) and her BP is now stable. Added ASA and Amlodipine to the medication list.  She states she does not need refill for Amlodipine sent to the pharmacy as she has enough medication at home currently.
Spoke with patient and reviewed lab results. She confirmed that she remains off Fenofibrate which was stopped at her June visit. She is asking when fasting lipids should be repeated and if she should remain off Fenofibrate for now. FYI for Dr. Rivka Vigil :   Patient states ASA (bleeding ) and Amlodipine (hypotension) were stopped after her May 2022 hospitalization for GI bleeding. She thinks the bleeding was caused by patient taking Ibuprofen. She thinks the bleeding was caused by taking NSAIDS (Ibuprofen). She has since resumed aspirin because of the benefits should receives from ASA given her hx of coronary artery calcification and atherosclerosis of aortic arch and descending thoracic aorta. She has not had recurrent bleeding issues. She also resumed Amlodipine 7.5 mg daily (take one 2.5 mg tablet with one 5 mg tablet) and her BP is now stable. Added ASA and Amlodipine to the medication list.  She states she does not need refill for Amlodipine sent to the pharmacy as she has enough medication at home currently. Discussed with Dr. Rivka Vigil. Patient should remain off Fenofibrate and continue Rosuvastatin 10 mg daily and repeat fasting lipids, AST, ALT in six months. He was notified that patient resumed ASA and Amlodipine and is in agreement with medication changes she made.
[de-identified] : s/p septo/FESS (R antrostomy & anterior eth'y) 10/27/21; doing well with excellent breathing and no facial pressure. Irrigating.